# Patient Record
Sex: MALE | Race: WHITE | Employment: PART TIME | URBAN - METROPOLITAN AREA
[De-identification: names, ages, dates, MRNs, and addresses within clinical notes are randomized per-mention and may not be internally consistent; named-entity substitution may affect disease eponyms.]

---

## 2017-07-03 ENCOUNTER — OFFICE VISIT (OUTPATIENT)
Dept: INTERNAL MEDICINE CLINIC | Age: 77
End: 2017-07-03

## 2017-07-03 VITALS
WEIGHT: 236.6 LBS | HEIGHT: 67 IN | DIASTOLIC BLOOD PRESSURE: 70 MMHG | HEART RATE: 81 BPM | BODY MASS INDEX: 37.13 KG/M2 | SYSTOLIC BLOOD PRESSURE: 120 MMHG | OXYGEN SATURATION: 98 %

## 2017-07-03 DIAGNOSIS — E66.9 OBESITY (BMI 30-39.9): Chronic | ICD-10-CM

## 2017-07-03 DIAGNOSIS — K21.9 GASTROESOPHAGEAL REFLUX DISEASE WITHOUT ESOPHAGITIS: ICD-10-CM

## 2017-07-03 DIAGNOSIS — E78.5 HYPERLIPIDEMIA LDL GOAL <130: ICD-10-CM

## 2017-07-03 DIAGNOSIS — Z80.42 FAMILY HISTORY OF PROSTATE CANCER: ICD-10-CM

## 2017-07-03 DIAGNOSIS — I10 ESSENTIAL HYPERTENSION: Primary | Chronic | ICD-10-CM

## 2017-07-03 DIAGNOSIS — R73.01 IFG (IMPAIRED FASTING GLUCOSE): ICD-10-CM

## 2017-07-03 DIAGNOSIS — M89.9 DISORDER OF BONE: ICD-10-CM

## 2017-07-03 PROCEDURE — G8510 SCR DEP NEG, NO PLAN REQD: HCPCS | Performed by: INTERNAL MEDICINE

## 2017-07-03 PROCEDURE — 99214 OFFICE O/P EST MOD 30 MIN: CPT | Performed by: INTERNAL MEDICINE

## 2017-07-03 PROCEDURE — 3288F FALL RISK ASSESSMENT DOCD: CPT | Performed by: INTERNAL MEDICINE

## 2017-07-03 RX ORDER — LISINOPRIL 40 MG/1
40 TABLET ORAL DAILY
Qty: 90 TABLET | Refills: 3 | Status: ON HOLD | OUTPATIENT
Start: 2017-07-03 | End: 2018-05-02

## 2017-07-03 RX ORDER — TRIAMTERENE AND HYDROCHLOROTHIAZIDE 37.5; 25 MG/1; MG/1
1 CAPSULE ORAL DAILY
Qty: 90 CAPSULE | Refills: 3 | Status: SHIPPED | OUTPATIENT
Start: 2017-07-03 | End: 2018-09-08 | Stop reason: SDUPTHER

## 2017-07-03 ASSESSMENT — ENCOUNTER SYMPTOMS
BACK PAIN: 0
EYE REDNESS: 0
SPUTUM PRODUCTION: 0
BLOOD IN STOOL: 0
ORTHOPNEA: 0
ABDOMINAL PAIN: 0
HEMOPTYSIS: 0
CONSTIPATION: 0
DOUBLE VISION: 0
BLURRED VISION: 0
EYES NEGATIVE: 1
SORE THROAT: 0
WHEEZING: 0
RESPIRATORY NEGATIVE: 1
HEARTBURN: 0
GASTROINTESTINAL NEGATIVE: 1
COUGH: 0
EYE DISCHARGE: 0
DIARRHEA: 0
PHOTOPHOBIA: 0
STRIDOR: 0
EYE PAIN: 0
VOMITING: 0
NAUSEA: 0
SHORTNESS OF BREATH: 0

## 2017-07-03 ASSESSMENT — PATIENT HEALTH QUESTIONNAIRE - PHQ9
2. FEELING DOWN, DEPRESSED OR HOPELESS: 0
SUM OF ALL RESPONSES TO PHQ QUESTIONS 1-9: 0
1. LITTLE INTEREST OR PLEASURE IN DOING THINGS: 0
SUM OF ALL RESPONSES TO PHQ9 QUESTIONS 1 & 2: 0

## 2017-10-03 ENCOUNTER — OFFICE VISIT (OUTPATIENT)
Dept: ORTHOPEDIC SURGERY | Age: 77
End: 2017-10-03

## 2017-10-03 VITALS
WEIGHT: 230 LBS | BODY MASS INDEX: 36.02 KG/M2 | DIASTOLIC BLOOD PRESSURE: 64 MMHG | HEART RATE: 67 BPM | SYSTOLIC BLOOD PRESSURE: 109 MMHG

## 2017-10-03 DIAGNOSIS — Z96.652 STATUS POST TOTAL LEFT KNEE REPLACEMENT: Primary | Chronic | ICD-10-CM

## 2017-10-03 PROCEDURE — 99203 OFFICE O/P NEW LOW 30 MIN: CPT | Performed by: ORTHOPAEDIC SURGERY

## 2017-10-03 NOTE — PROGRESS NOTES
Effusion  neg   Ecchymosis  neg   Errythemia  neg   Warmth   neg   Deformity  neg   Crepitus  neg   Patellar Subluxation  neg   Tenderness  neg   Log Roll  neg   Patellar Apprehension  neg   Patellar Grind  neg   Extension Lag neg neg     Neurovascular Status:  intact    Range of Motion Extension Flexion Pules (0-4) Dorsalis  Pedis Posterior  Tibialis   Right 8 125         Degrees Right       Left   3 118         Degrees Left 2+        Test Including Ligamentous Stability/ Positive or Negative                                       Test          Right         Left   Valgus  neg   Varus                  neg                   Lachman  neg   Anterior Drawer  neg   Posterior Drawer  neg   Amna     Pivot Shift     Quadraceps Active     Atrophy       X-Ray Findings taken in Office:  3 views left knee read by myself show :Left Total replacement remaining in good position with no signs of ostial lysis or loosening. No evidence of fracture. Impression:   1. IT band tendinitis at the left knee likely related to overactivity doing exercises. 2. His left  total knee replacement otherwise appears to be doing well. Plan/Treatment:   1. He was told to his exercise program but to avoid irritating the IT band insertion. 2. He is otherwise to remain active and continue his aquatic exercises. 3. Keep his weight down. 4. Return here as needed. Sandra Sweet MD  10/3/2017    This dictation was done with Dragon dictnacho and may contain mechanical errors related to translation.

## 2017-10-03 NOTE — PATIENT INSTRUCTIONS
Impression:   1. IT band tendinitis at the left knee likely related to overactivity doing exercises. 2. His total knee replacement otherwise appears to be doing well. Plan/Treatment:   1. He was told to his exercise program to avoid irritating the IT band insertion. 2. He is otherwise to remain active and continue his aquatic exercises. 3. Keep his weight down. 4. Return here as needed.       Herminio Hutchinson MD  10/3/2017

## 2017-10-03 NOTE — MR AVS SNAPSHOT
people who are more muscular. Even a small weight loss (between 5 and 10 percent of your current weight) by decreasing your calorie intake and becoming more physically active will help lower your risk of developing or worsening diseases associated with obesity. Learn more at: [employer]acedco.uk             Medications and Orders      Your Current Medications Are              triamterene-hydrochlorothiazide (DYAZIDE) 37.5-25 MG per capsule Take 1 capsule by mouth daily    lisinopril (PRINIVIL;ZESTRIL) 40 MG tablet Take 1 tablet by mouth daily    omeprazole (PRILOSEC) 20 MG capsule Take 20 mg by mouth daily. aspirin 81 MG tablet Take 81 mg by mouth daily.         Allergies              Acetaminophen       We Ordered/Performed the following           XR KNEE LEFT (3 VIEWS)     Comments:    3V Lt Knee AP Standing, lat and sunrise         Result Summary for XR KNEE LEFT (3 VIEWS)      Result Information     Status          Final result (Exam End: 10/3/2017 11:17 AM)           10/3/2017 11:17 AM      Narrative & Impression           Radiology result is complete; follow up with provider / physician office for radiology results                       Additional Information        Basic Information     Date Of Birth Sex Race Ethnicity Preferred Language    1940 Male White Non-/Non  English      Problem List as of 10/3/2017  Date Reviewed: 10/3/2017                Hyperlipidemia LDL goal <130    Obesity (BMI 30-39.9) (Chronic)    Family history of prostate cancer    Status post total left knee replacement (Chronic)    Essential hypertension (Chronic)    Gastroesophageal reflux disease without esophagitis    Primary osteoarthritis of right knee (Chronic)    Osteoarthritis of left knee      Immunizations as of 10/3/2017     Name Date    Influenza Vaccine, unspecified formulation 12/6/2015    Influenza, Nancie Cranker, 3 Years and older, IM 10/5/2016 Pneumococcal 13-valent Conjugate ([id number]) 12/30/2016    Tdap (Boostrix, Adacel) 12/30/2016    Zoster 10/20/2015      Preventive Care        Date Due    Yearly Flu Vaccine (1) 9/1/2017    Pneumococcal Vaccines (two) for all adults aged 72 and over (2 of 2 - PPSV23) 12/30/2017    Cholesterol Screening 12/30/2021    Tetanus Combination Vaccine (2 - Td) 12/30/2026            MyChart Signup           [facility] allows you to send messages to your doctor, view your test results, renew your prescriptions, schedule appointments, view visit notes, and more. How Do I Sign Up? 1. In your Internet browser, go to [email or web address]  2. Click on the Sign Up Now link in the Sign In box. You will see the New Member Sign Up page. 3. Enter your [facility] Access Code exactly as it appears below. You will not need to use this code after youve completed the sign-up process. If you do not sign up before the expiration date, you must request a new code. [facility] Access Code: [email or web address]  Expires: 12/2/2017 12:37 PM    4. Enter your Social Security Number ([id number]) and Date of Birth (mm/dd/yyyy) as indicated and click Submit. You will be taken to the next sign-up page. 5. Create a [facility] ID. This will be your [facility] login ID and cannot be changed, so think of one that is secure and easy to remember. 6. Create a [facility] password. You can change your password at any time. 7. Enter your Password Reset Question and Answer. This can be used at a later time if you forget your password. 8. Enter your e-mail address. You will receive e-mail notification when new information is available in [address] 9. Click Sign Up. You can now view your medical record. Additional Information  If you have questions, please contact the physician practice where you receive care. Remember, [facility] is NOT to be used for urgent needs. For medical emergencies, dial 911. For questions regarding your [facility] [employer] account call [phone]. If you have a clinical question, please call your doctor's office.

## 2018-01-26 ENCOUNTER — OFFICE VISIT (OUTPATIENT)
Dept: INTERNAL MEDICINE CLINIC | Age: 78
End: 2018-01-26

## 2018-01-26 VITALS
RESPIRATION RATE: 16 BRPM | HEIGHT: 67 IN | SYSTOLIC BLOOD PRESSURE: 128 MMHG | WEIGHT: 241 LBS | TEMPERATURE: 97.6 F | OXYGEN SATURATION: 98 % | BODY MASS INDEX: 37.83 KG/M2 | DIASTOLIC BLOOD PRESSURE: 78 MMHG | HEART RATE: 76 BPM

## 2018-01-26 DIAGNOSIS — J40 BRONCHITIS: ICD-10-CM

## 2018-01-26 PROCEDURE — 99213 OFFICE O/P EST LOW 20 MIN: CPT | Performed by: INTERNAL MEDICINE

## 2018-01-26 RX ORDER — CEPHALEXIN 500 MG/1
500 CAPSULE ORAL 4 TIMES DAILY
COMMUNITY
End: 2018-05-01 | Stop reason: ALTCHOICE

## 2018-01-26 RX ORDER — PREDNISONE 20 MG/1
TABLET ORAL
Qty: 15 TABLET | Refills: 0 | Status: SHIPPED | OUTPATIENT
Start: 2018-01-26 | End: 2018-04-18 | Stop reason: SDUPTHER

## 2018-01-26 RX ORDER — AZITHROMYCIN 250 MG/1
TABLET, FILM COATED ORAL
Qty: 1 PACKET | Refills: 0 | Status: SHIPPED | OUTPATIENT
Start: 2018-01-26 | End: 2018-02-05

## 2018-01-26 ASSESSMENT — ENCOUNTER SYMPTOMS
GASTROINTESTINAL NEGATIVE: 1
SPUTUM PRODUCTION: 0
COUGH: 1
HEMOPTYSIS: 0
SHORTNESS OF BREATH: 1
EYES NEGATIVE: 1
WHEEZING: 1

## 2018-01-26 NOTE — PATIENT INSTRUCTIONS
good.  When should you call for help? Call 911 anytime you think you may need emergency care. For example, call if:  ? · You have severe trouble breathing. ?Call your doctor now or seek immediate medical care if:  ? · You have new or worse trouble breathing. ? · You cough up dark brown or bloody mucus (sputum). ? · You have a new or higher fever. ? · You have a new rash. ? Watch closely for changes in your health, and be sure to contact your doctor if:  ? · You cough more deeply or more often, especially if you notice more mucus or a change in the color of your mucus. ? · You are not getting better as expected. Where can you learn more? Go to [email or web address] and sign in to your [employer] account. Enter H333 in the FlightStats box to learn more about \"Bronchitis: Care Instructions. \"     If you do not have an account, please click on the \"Sign Up Now\" link. Current as of: May 12, 2017  Content Version: 11.5  © 6152-2690 Healthwise, Incorporated. Care instructions adapted under license by [facility] ([facility] [place]). If you have questions about a medical condition or this instruction, always ask your healthcare professional. [address] any warranty or liability for your use of this information.

## 2018-01-29 DIAGNOSIS — R06.02 SHORTNESS OF BREATH: Primary | ICD-10-CM

## 2018-01-29 RX ORDER — FLUTICASONE FUROATE AND VILANTEROL 100; 25 UG/1; UG/1
POWDER RESPIRATORY (INHALATION)
Qty: 1 EACH | Refills: 0 | COMMUNITY
Start: 2018-01-29 | End: 2018-05-01 | Stop reason: ALTCHOICE

## 2018-04-18 ENCOUNTER — OFFICE VISIT (OUTPATIENT)
Dept: INTERNAL MEDICINE CLINIC | Age: 78
End: 2018-04-18

## 2018-04-18 VITALS
BODY MASS INDEX: 37.83 KG/M2 | HEIGHT: 67 IN | OXYGEN SATURATION: 96 % | SYSTOLIC BLOOD PRESSURE: 118 MMHG | DIASTOLIC BLOOD PRESSURE: 68 MMHG | TEMPERATURE: 98.6 F | RESPIRATION RATE: 16 BRPM | WEIGHT: 241 LBS | HEART RATE: 89 BPM

## 2018-04-18 DIAGNOSIS — J40 BRONCHITIS: Primary | ICD-10-CM

## 2018-04-18 PROCEDURE — 99213 OFFICE O/P EST LOW 20 MIN: CPT | Performed by: INTERNAL MEDICINE

## 2018-04-18 RX ORDER — PREDNISONE 20 MG/1
TABLET ORAL
Qty: 15 TABLET | Refills: 0 | Status: SHIPPED | OUTPATIENT
Start: 2018-04-18 | End: 2018-05-01 | Stop reason: ALTCHOICE

## 2018-04-18 RX ORDER — DOXYCYCLINE HYCLATE 100 MG/1
100 CAPSULE ORAL 2 TIMES DAILY
Qty: 20 CAPSULE | Refills: 0 | Status: SHIPPED | OUTPATIENT
Start: 2018-04-18 | End: 2018-04-28

## 2018-04-18 ASSESSMENT — ENCOUNTER SYMPTOMS
COUGH: 1
HEMOPTYSIS: 0
WHEEZING: 1
SHORTNESS OF BREATH: 1
EYES NEGATIVE: 1
SPUTUM PRODUCTION: 0
GASTROINTESTINAL NEGATIVE: 1

## 2018-05-01 PROBLEM — G45.1 HEMISPHERIC CAROTID ARTERY SYNDROME: Status: ACTIVE | Noted: 2018-05-01

## 2018-05-01 PROBLEM — G45.9 TIA (TRANSIENT ISCHEMIC ATTACK): Status: ACTIVE | Noted: 2018-05-01

## 2018-05-03 ENCOUNTER — TELEPHONE (OUTPATIENT)
Dept: PHARMACY | Facility: CLINIC | Age: 78
End: 2018-05-03

## 2018-05-03 DIAGNOSIS — G45.1 HEMISPHERIC CAROTID ARTERY SYNDROME: Primary | ICD-10-CM

## 2018-05-03 PROCEDURE — 1111F DSCHRG MED/CURRENT MED MERGE: CPT

## 2018-05-11 ENCOUNTER — OFFICE VISIT (OUTPATIENT)
Dept: INTERNAL MEDICINE CLINIC | Age: 78
End: 2018-05-11

## 2018-05-11 VITALS
HEIGHT: 67 IN | DIASTOLIC BLOOD PRESSURE: 70 MMHG | SYSTOLIC BLOOD PRESSURE: 110 MMHG | TEMPERATURE: 98.3 F | HEART RATE: 97 BPM | BODY MASS INDEX: 36.57 KG/M2 | OXYGEN SATURATION: 85 % | WEIGHT: 233 LBS | RESPIRATION RATE: 16 BRPM

## 2018-05-11 DIAGNOSIS — I10 ESSENTIAL HYPERTENSION: Chronic | ICD-10-CM

## 2018-05-11 DIAGNOSIS — E78.5 HYPERLIPIDEMIA LDL GOAL <130: ICD-10-CM

## 2018-05-11 DIAGNOSIS — I45.10 INCOMPLETE RIGHT BUNDLE BRANCH BLOCK (RBBB) DETERMINED BY ELECTROCARDIOGRAPHY: ICD-10-CM

## 2018-05-11 DIAGNOSIS — E66.9 OBESITY (BMI 30-39.9): Chronic | ICD-10-CM

## 2018-05-11 DIAGNOSIS — G45.1 HEMISPHERIC CAROTID ARTERY SYNDROME: Primary | ICD-10-CM

## 2018-05-11 DIAGNOSIS — G45.9 TRANSIENT CEREBRAL ISCHEMIA, UNSPECIFIED TYPE: ICD-10-CM

## 2018-05-11 PROCEDURE — 99214 OFFICE O/P EST MOD 30 MIN: CPT | Performed by: INTERNAL MEDICINE

## 2018-05-11 ASSESSMENT — ENCOUNTER SYMPTOMS
HEMOPTYSIS: 0
ORTHOPNEA: 0
CONSTIPATION: 0
BACK PAIN: 0
NAUSEA: 0
SORE THROAT: 0
RESPIRATORY NEGATIVE: 1
WHEEZING: 0
HEARTBURN: 0
PHOTOPHOBIA: 0
BLURRED VISION: 0
EYE DISCHARGE: 0
BLOOD IN STOOL: 0
EYE PAIN: 0
DOUBLE VISION: 0
DIARRHEA: 0
VOMITING: 0
COUGH: 0
SPUTUM PRODUCTION: 0
ABDOMINAL PAIN: 0
EYE REDNESS: 0
GASTROINTESTINAL NEGATIVE: 1
EYES NEGATIVE: 1
SHORTNESS OF BREATH: 0
STRIDOR: 0

## 2018-05-17 ENCOUNTER — TELEPHONE (OUTPATIENT)
Dept: CARDIOLOGY CLINIC | Age: 78
End: 2018-05-17

## 2018-06-12 ENCOUNTER — TELEPHONE (OUTPATIENT)
Dept: INTERNAL MEDICINE CLINIC | Age: 78
End: 2018-06-12

## 2018-06-28 ENCOUNTER — OFFICE VISIT (OUTPATIENT)
Dept: CARDIOLOGY CLINIC | Age: 78
End: 2018-06-28

## 2018-06-28 VITALS
DIASTOLIC BLOOD PRESSURE: 70 MMHG | BODY MASS INDEX: 36.49 KG/M2 | OXYGEN SATURATION: 95 % | SYSTOLIC BLOOD PRESSURE: 138 MMHG | WEIGHT: 233 LBS | HEART RATE: 76 BPM

## 2018-06-28 DIAGNOSIS — I45.10 RBBB: ICD-10-CM

## 2018-06-28 DIAGNOSIS — G45.9 TRANSIENT CEREBRAL ISCHEMIA, UNSPECIFIED TYPE: Primary | ICD-10-CM

## 2018-06-28 PROCEDURE — 99204 OFFICE O/P NEW MOD 45 MIN: CPT | Performed by: INTERNAL MEDICINE

## 2018-07-03 ENCOUNTER — NURSE ONLY (OUTPATIENT)
Dept: CARDIOLOGY CLINIC | Age: 78
End: 2018-07-03

## 2018-08-14 PROCEDURE — 93228 REMOTE 30 DAY ECG REV/REPORT: CPT | Performed by: INTERNAL MEDICINE

## 2018-08-20 DIAGNOSIS — G45.9 TRANSIENT CEREBRAL ISCHEMIA, UNSPECIFIED TYPE: ICD-10-CM

## 2018-08-21 ENCOUNTER — OFFICE VISIT (OUTPATIENT)
Dept: INTERNAL MEDICINE CLINIC | Age: 78
End: 2018-08-21

## 2018-08-21 VITALS
OXYGEN SATURATION: 97 % | HEIGHT: 67 IN | WEIGHT: 231 LBS | BODY MASS INDEX: 36.26 KG/M2 | DIASTOLIC BLOOD PRESSURE: 72 MMHG | RESPIRATION RATE: 12 BRPM | SYSTOLIC BLOOD PRESSURE: 140 MMHG | HEART RATE: 80 BPM

## 2018-08-21 DIAGNOSIS — E78.00 HIGH CHOLESTEROL: ICD-10-CM

## 2018-08-21 DIAGNOSIS — K21.9 GASTROESOPHAGEAL REFLUX DISEASE, ESOPHAGITIS PRESENCE NOT SPECIFIED: ICD-10-CM

## 2018-08-21 DIAGNOSIS — I45.10 RBBB: ICD-10-CM

## 2018-08-21 DIAGNOSIS — I37.0 NONRHEUMATIC PULMONARY VALVE STENOSIS: ICD-10-CM

## 2018-08-21 DIAGNOSIS — I10 ESSENTIAL HYPERTENSION: ICD-10-CM

## 2018-08-21 PROCEDURE — 99214 OFFICE O/P EST MOD 30 MIN: CPT | Performed by: INTERNAL MEDICINE

## 2018-08-21 PROCEDURE — G8510 SCR DEP NEG, NO PLAN REQD: HCPCS | Performed by: INTERNAL MEDICINE

## 2018-08-21 PROCEDURE — 3288F FALL RISK ASSESSMENT DOCD: CPT | Performed by: INTERNAL MEDICINE

## 2018-08-21 ASSESSMENT — PATIENT HEALTH QUESTIONNAIRE - PHQ9
SUM OF ALL RESPONSES TO PHQ QUESTIONS 1-9: 0
SUM OF ALL RESPONSES TO PHQ QUESTIONS 1-9: 0
2. FEELING DOWN, DEPRESSED OR HOPELESS: 0
1. LITTLE INTEREST OR PLEASURE IN DOING THINGS: 0
SUM OF ALL RESPONSES TO PHQ9 QUESTIONS 1 & 2: 0

## 2018-08-21 NOTE — PROGRESS NOTES
OUTPATIENT PROGRESS NOTE  Date of Service:  8/21/2018  Address: [address] [place] [address] INTERNAL MEDICINE  [address] [place] [address]  Dept: [phone]    Subjective:    No chief complaint on file. Patient ID: [id number]  Sade Thomas is a 66 y.o. male  (Location/Symptom, Timing/Onset, Context/Setting, Quality, Duration, Modifying Factors, Severity)  HPIwith htn gerd and tia who is here to establish. He is active and exercising. He is feeling well. Records reviewed. Meds reviewed. Right ankle bothering him. It is more swollen skin more puffed up  Has been less swollen with swimming. Has noticed swelling in his legs for a bout a year. Allergies   Allergen Reactions    Acetaminophen      Current Outpatient Prescriptions   Medication Sig Dispense Refill    psyllium (KONSYL) 28.3 % PACK Take 1 packet by mouth 2 times daily      atorvastatin (LIPITOR) 40 MG tablet Take 1 tablet by mouth nightly 30 tablet 3    lisinopril (PRINIVIL;ZESTRIL) 10 MG tablet Take 1 tablet by mouth daily 30 tablet 5    triamterene-hydrochlorothiazide (DYAZIDE) 37.5-25 MG per capsule Take 1 capsule by mouth daily 90 capsule 3    omeprazole (PRILOSEC) 20 MG capsule Take 20 mg by mouth every 72 hours       aspirin 81 MG tablet Take 81 mg by mouth daily.  clopidogrel (PLAVIX) 75 MG tablet Take 1 tablet by mouth daily 30 tablet 11     No current facility-administered medications for this visit.       Past Medical History:   Diagnosis Date    Acid reflux     HTN (hypertension)      Past Surgical History:   Procedure Laterality Date    CATARACT REMOVAL Bilateral 8/13/2014    Dr. Daly Brochure Left 2/4/2013    Dr Walters March     Social History   Substance Use Topics    Smoking status: Never Smoker    Smokeless tobacco: Never Used    Alcohol use No     Family History   Problem Relation Age of observe treat doesn't want leroy right now    Sebastian Snyder MD

## 2018-08-22 ENCOUNTER — OFFICE VISIT (OUTPATIENT)
Dept: CARDIOLOGY CLINIC | Age: 78
End: 2018-08-22

## 2018-08-22 VITALS
WEIGHT: 231 LBS | DIASTOLIC BLOOD PRESSURE: 60 MMHG | HEART RATE: 70 BPM | SYSTOLIC BLOOD PRESSURE: 130 MMHG | BODY MASS INDEX: 36.18 KG/M2

## 2018-08-22 DIAGNOSIS — G45.9 TRANSIENT CEREBRAL ISCHEMIA, UNSPECIFIED TYPE: Primary | ICD-10-CM

## 2018-08-22 DIAGNOSIS — I70.90 ATHEROSCLEROTIC VASCULAR DISEASE: ICD-10-CM

## 2018-08-22 PROCEDURE — 99214 OFFICE O/P EST MOD 30 MIN: CPT | Performed by: NURSE PRACTITIONER

## 2018-08-22 NOTE — PROGRESS NOTES
Laterality Date    CATARACT REMOVAL Bilateral 8/13/2014    Dr. Jeffrey Zamudio Left 2/4/2013    Dr Jerry Portillo       Allergies   Allergen Reactions    Acetaminophen        Social History:  Reviewed. reports that he has never smoked. He has never used smokeless tobacco. He reports that he does not drink alcohol or use drugs. Family History:  Reviewed. family history includes Heart Failure in his brother; Prostate Cancer in his brother. Review of System:    · Constitutional: No fevers, chills. · Eyes: No visual changes or diplopia. No scleral icterus. · ENT: No Headaches. No mouth sores or sore throat. · Cardiovascular: No for chest pain, No for dyspnea on exertion, No for palpitations or No for loss of consciousness. No cough, hemoptysis, No for pleuritic pain, or phlebitis. · Respiratory: No for cough or wheezing. No hematemesis. · Gastrointestinal: No abdominal pain, blood in stools. · Genitourinary: No dysuria, or hematuria. · Musculoskeletal: No gait disturbance,    · Integumentary: No rash or pruritis. · Neurological: No headache, change in muscle strength, numbness or tingling. · Psychiatric: No anxiety, or depression. · Endocrine: No temperature intolerance. No excessive thirst, fluid intake, or urination. · Hem/Lymph: No abnormal bruising or bleeding, blood clots or swollen lymph nodes. · Allergic/Immunologic: No nasal congestion or hives. Physical Examination:  Vitals:    08/22/18 1103   BP: 130/60   Pulse: 70         Wt Readings from Last 3 Encounters:   08/22/18 231 lb (104.8 kg)   08/21/18 231 lb (104.8 kg)   06/28/18 233 lb (105.7 kg)       · Constitutional: Oriented. No distress. · Head: Normocephalic and atraumatic. · Mouth/Throat: Oropharynx is clear and moist.   · Eyes: Conjunctivae clear without jaunduice. PERRL. · Neck: Neck supple. No rigidity. No JVD present. · Cardiovascular: Normal rate, regular rhythm, S1&S2. · Pulmonary/Chest: Bilateral respiratory sounds. No wheezes, No rhonchi. · Abdominal: Soft. Bowel sounds present. No distension, No tenderness. · Musculoskeletal: No tenderness. No edema    · Lymphadenopathy: Has no cervical adenopathy. · Neurological: Alert and oriented. Cranial nerve appears intact, No Gross deficit   · Skin: Skin is warm and dry. No rash noted. · Psychiatric: Has a normal mood, affect and behavior     Labs:  Reviewed. No results for input(s): NA, K, CL, CO2, PHOS, BUN, CREATININE in the last 72 hours. Invalid input(s): CA,  TSH  No results for input(s): WBC, HGB, HCT, MCV, PLT in the last 72 hours. Lab Results   Component Value Date    TROPONINI <0.01 05/01/2018     No results found for: BNP  Lab Results   Component Value Date    PROTIME 11.5 05/01/2018    PROTIME 10.6 01/30/2013    INR 1.02 05/01/2018    INR 0.93 01/30/2013     Lab Results   Component Value Date    CHOL 176 05/02/2018    HDL 50 05/02/2018    TRIG 151 05/02/2018       ECG: Personally reviewed: NSR, HR 70, , ,     ECHO:  5/2/2018  Summary   Normal LV size and systolic function. Estimated ejection fraction is 60%.    Mild pulmonic stenosis.   Trivial mitral regurgitation is present.   The left atrium is normal in size.   The right ventricle is normal in size and function.   Mild tricuspid regurgitation.       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

## 2018-08-27 DIAGNOSIS — I10 ESSENTIAL HYPERTENSION: Chronic | ICD-10-CM

## 2018-08-27 RX ORDER — LISINOPRIL 10 MG/1
10 TABLET ORAL DAILY
Qty: 30 TABLET | Refills: 5 | Status: SHIPPED | OUTPATIENT
Start: 2018-08-27 | End: 2018-09-24 | Stop reason: SDUPTHER

## 2018-08-27 RX ORDER — ATORVASTATIN CALCIUM 40 MG/1
40 TABLET, FILM COATED ORAL NIGHTLY
Qty: 30 TABLET | Refills: 3 | Status: SHIPPED | OUTPATIENT
Start: 2018-08-27 | End: 2018-09-24 | Stop reason: SDUPTHER

## 2018-09-07 ENCOUNTER — TELEPHONE (OUTPATIENT)
Dept: CARDIOLOGY CLINIC | Age: 78
End: 2018-09-07

## 2018-09-07 NOTE — TELEPHONE ENCOUNTER
Patient called to inform Corrie Cruz that he will go ahead get the loop recorder they talked about in his 8/22 appointment. Some dates that work for patient are Sep 25.26,27,28. October is open but No Mondays.      Call back is [phone]

## 2018-09-08 DIAGNOSIS — I10 ESSENTIAL HYPERTENSION: Chronic | ICD-10-CM

## 2018-09-09 RX ORDER — TRIAMTERENE AND HYDROCHLOROTHIAZIDE 37.5; 25 MG/1; MG/1
1 CAPSULE ORAL DAILY
Qty: 90 CAPSULE | Refills: 3 | Status: SHIPPED | OUTPATIENT
Start: 2018-09-09 | End: 2018-09-24 | Stop reason: SDUPTHER

## 2018-09-10 ENCOUNTER — NURSE ONLY (OUTPATIENT)
Dept: CARDIOLOGY CLINIC | Age: 78
End: 2018-09-10

## 2018-09-10 NOTE — PROGRESS NOTES
Implantable Loop Recorder    Date of Procedure:  Sept. 13, 2018    Time of Arrival:  11:30 am    Cardiologist performing procedure:  Dr. Martinez Arnold    · Arrive at [facility] through the main entrance. Check in at the Outpatient Diagnostic desk on the 1st floor. · No food or drink for 2 hours before your procedure. · You may brush your teeth and rinse the morning of the procedure. · Take ALL of your routine medications the morning of the procedure. · Do NOT stop taking aspirin, Plavix, coumadin, Eliquis, Xarelto, or Pradaxa (any blood thinners)    · Please use Hibiclens soap to wash your neck, chest, and abdomen the night before your procedure. · Do not apply any lotion, deodorant, or powder the morning of the procedure. · Please bring a list of your medications to the hospital with you. · You may drive yourself home. · If you are unable to make this appointment, please call [facility] Cardiology at [phone].

## 2018-09-13 ENCOUNTER — HOSPITAL ENCOUNTER (OUTPATIENT)
Dept: CARDIAC CATH/INVASIVE PROCEDURES | Age: 78
Discharge: HOME | End: 2018-09-13
Attending: INTERNAL MEDICINE | Admitting: INTERNAL MEDICINE
Payer: MEDICARE

## 2018-09-13 PROBLEM — Z45.09 ENCOUNTER FOR ELECTRONIC ANALYSIS OF REVEAL EVENT RECORDER: Status: ACTIVE | Noted: 2018-09-13

## 2018-09-13 LAB
REASON FOR REJECTION: NORMAL
REJECTED TEST: NORMAL

## 2018-09-13 PROCEDURE — 33282 PR IMPLANTATION PT-ACTIVATED CARDIAC EVENT RECORDER: CPT | Performed by: INTERNAL MEDICINE

## 2018-09-13 PROCEDURE — 33282 HC IMPANTABLE LOOP RECORDER: CPT

## 2018-09-13 PROCEDURE — C1764 EVENT RECORDER, CARDIAC: HCPCS

## 2018-09-13 PROCEDURE — 85610 PROTHROMBIN TIME: CPT

## 2018-09-13 RX ORDER — SODIUM CHLORIDE 9 MG/ML
INJECTION, SOLUTION INTRAVENOUS CONTINUOUS
Status: DISCONTINUED | OUTPATIENT
Start: 2018-09-13 | End: 2018-09-13 | Stop reason: HOSPADM

## 2018-09-13 RX ORDER — SODIUM CHLORIDE 0.9 % (FLUSH) 0.9 %
10 SYRINGE (ML) INJECTION PRN
Status: DISCONTINUED | OUTPATIENT
Start: 2018-09-13 | End: 2018-09-13 | Stop reason: HOSPADM

## 2018-09-13 RX ORDER — SODIUM CHLORIDE 0.9 % (FLUSH) 0.9 %
10 SYRINGE (ML) INJECTION EVERY 12 HOURS SCHEDULED
Status: DISCONTINUED | OUTPATIENT
Start: 2018-09-13 | End: 2018-09-13 | Stop reason: HOSPADM

## 2018-09-13 NOTE — H&P
HPI:  Marlyn Ellis is a 68 y.o. male referred by Dr. Reba He to EP for evaluation after TIA. H/o GERD, HTN, HLD, prediabetes, and obesity. Presented to [facility] on 5/1/18 with sudden onset slurred speech, facial droop, and gait abnormality which lasted about 10 min. EKG in ED showed incomplete RBBB and CTA head showed no high-grade stenosis or focal occlusion involving the intracranial vasculature.  No evidence of intracranial dissection. No evidence of acute CVA. Also, noted was approximately 2-3 mm outpouching arising from the left internal carotid artery projecting anteriorly. Echo (5/2/18) showed EF of 60% and some trivial mitral regurgitation and mild pulmonic stenosis. He was started on Plavix, ASA, and statin.       Has been asymptomatic since hospital discharge. Denies complaints of CP, SOB, dizziness, palpitations, orthopnea, presycope/syncope, or edema. He is fairly active doing yard work and swimming. Good exercise tolerance. There is no family history of atrial fibrillation     Past Medical History:   has a past medical history of Acid reflux and HTN (hypertension).     Surgical History:   has a past surgical history that includes Total knee arthroplasty (Left, 2/4/2013); Esophagus dilation (2006); Tonsillectomy and adenoidectomy (1946); and Cataract removal (Bilateral, 8/13/2014).    Social History:   reports that he has never smoked.  He has never used smokeless tobacco. He reports that he does not drink alcohol or use drugs.      Family History:  family history includes Heart Failure in his brother; Prostate Cancer in his brother.      Home Medications:  Encounter Medications          Outpatient Encounter Prescriptions as of 6/28/2018   Medication Sig Dispense Refill    atorvastatin (LIPITOR) 40 MG tablet Take 1 tablet by mouth nightly 30 tablet 3    clopidogrel (PLAVIX) 75 MG tablet Take 1 tablet by mouth daily 30 tablet 11    lisinopril (PRINIVIL;ZESTRIL) 10 MG tablet Take 1 tablet patient without evidence for vascular disease. Unrecognized atrial fibrillation remains a possible etiology for the episode. 2. RBBB             Plan:  1. ILR implant for AF surveillance.   2.  Follow up in 6-8 weeks after monitor.  Chay Ramos M.D.

## 2018-09-13 NOTE — PROCEDURES
After informed consent was obtained the patient is brought to the cardiac electrophysiology laboratory in the fasting state on 9/13/18. He is prepared for the procedure with application of ECG electrodes. The left chest was shaved and prepped with antibacterial soap and the patient was draped sterile fashion. A [employer] [id number] ILR serial number [id number] was implanted in the left parasternal 4th intercostal space SQ. R-wave sensing was 0.33 mV. The incision was closed with a single 3-0 Prolene suture and Steri-Strips. The wound was covered by dry sterile dressing. The patient tolerated the procedure well. There were no apparent complications.

## 2018-09-14 LAB — INR BLD: 1.2 (ref 0.8–1.4)

## 2018-09-20 ENCOUNTER — PROCEDURE VISIT (OUTPATIENT)
Dept: CARDIOLOGY CLINIC | Age: 78
End: 2018-09-20

## 2018-09-20 DIAGNOSIS — Z45.09 ENCOUNTER FOR ELECTRONIC ANALYSIS OF REVEAL EVENT RECORDER: ICD-10-CM

## 2018-09-20 DIAGNOSIS — I63.9 CRYPTOGENIC STROKE (HCC): Primary | ICD-10-CM

## 2018-09-20 PROCEDURE — 93291 INTERROG DEV EVAL SCRMS IP: CPT | Performed by: INTERNAL MEDICINE

## 2018-10-24 NOTE — PROGRESS NOTES
Dr. Donavon Garces Left 2/4/2013    Dr Nair Prophet       Allergies   Allergen Reactions    Acetaminophen        Social History:  Reviewed. reports that he has never smoked. He has never used smokeless tobacco. He reports that he does not drink alcohol or use drugs. Family History:  Reviewed. family history includes Heart Failure in his brother; Prostate Cancer in his brother. Review of System:    · Constitutional: No fevers, chills. · Eyes: No visual changes or diplopia. No scleral icterus. · ENT: No Headaches. No mouth sores or sore throat. · Cardiovascular: No for chest pain, No for dyspnea on exertion, No for palpitations or No for loss of consciousness. No cough, hemoptysis, No for pleuritic pain, or phlebitis. · Respiratory: No for cough or wheezing. No hematemesis. · Gastrointestinal: No abdominal pain, blood in stools. · Genitourinary: No dysuria, or hematuria. · Musculoskeletal: No gait disturbance,    · Integumentary: No rash or pruritis. · Neurological: No headache, change in muscle strength, numbness or tingling. · Psychiatric: No anxiety, or depression. · Endocrine: No temperature intolerance. No excessive thirst, fluid intake, or urination. · Hem/Lymph: No abnormal bruising or bleeding, blood clots or swollen lymph nodes. · Allergic/Immunologic: No nasal congestion or hives. Physical Examination:  Vitals:    10/25/18 1315   BP: 136/84   Pulse: 74   SpO2: 93%         Wt Readings from Last 3 Encounters:   10/25/18 231 lb (104.8 kg)   08/22/18 231 lb (104.8 kg)   08/21/18 231 lb (104.8 kg)       · Constitutional: Oriented. No distress. · Head: Normocephalic and atraumatic. · Mouth/Throat: Oropharynx is clear and moist.   · Eyes: Conjunctivae clear without jaunduice. PERRL. · Neck: Neck supple. No rigidity. No JVD present. · Cardiovascular: Normal rate, regular rhythm, S1&S2. hypertension 01/22/2013    Gastroesophageal reflux disease without esophagitis 01/22/2013    Primary osteoarthritis of right knee 10/23/2012    Osteoarthritis of left knee 10/23/2012    GERD (gastroesophageal reflux disease) 01/01/2002        Assessment:   1. TIA (transient ischemic attack)    2. Atherosclerotic vascular disease        Cardiac HX: Sharee Joshi is a 66 y.o. man with a h/o HTN, HLD, RBBB, TIA, seen by EP for eval of atrial arhythmia as a cause of CVA/TIA. TIA  - 30 day monitor MCOT (7/11/18 - 8/9/18) showed min HR of 50, avg of 76, max 120, occasional PACs, PVCs. No AF.  - S/p MDT loop implant  - Device check today shows NSR, no arrhythmias    Atherosclerotic disease  - CT of the neck showed some atherosclerosis in the aortic arch and bilateral subclavian arteries  - Consider stress test if s/s   - ECG ordered and results personally reviewed     EF of 48%  No systolic HF  No known CAD  No known AF   No Tobacco use. All questions and concerns were addressed to the patient/family. Alternatives to my treatment were discussed. The note was completed using EMR. Every effort was made to ensure accuracy; however, inadvertent computerized transcription errors may be present. Patient received education regarding their diagnosis, treatment and medications while in the office today.       Gabriella Miller[address]

## 2018-10-25 ENCOUNTER — OFFICE VISIT (OUTPATIENT)
Dept: CARDIOLOGY CLINIC | Age: 78
End: 2018-10-25
Payer: MEDICARE

## 2018-10-25 ENCOUNTER — PROCEDURE VISIT (OUTPATIENT)
Dept: CARDIOLOGY CLINIC | Age: 78
End: 2018-10-25
Payer: MEDICARE

## 2018-10-25 VITALS
SYSTOLIC BLOOD PRESSURE: 136 MMHG | BODY MASS INDEX: 36.18 KG/M2 | WEIGHT: 231 LBS | OXYGEN SATURATION: 93 % | DIASTOLIC BLOOD PRESSURE: 84 MMHG | HEART RATE: 74 BPM

## 2018-10-25 DIAGNOSIS — I70.90 ATHEROSCLEROTIC VASCULAR DISEASE: ICD-10-CM

## 2018-10-25 DIAGNOSIS — G45.9 TIA (TRANSIENT ISCHEMIC ATTACK): Primary | ICD-10-CM

## 2018-10-25 DIAGNOSIS — Z45.09 ENCOUNTER FOR ELECTRONIC ANALYSIS OF REVEAL EVENT RECORDER: ICD-10-CM

## 2018-10-25 DIAGNOSIS — I63.9 CRYPTOGENIC STROKE (HCC): ICD-10-CM

## 2018-10-25 PROCEDURE — 99214 OFFICE O/P EST MOD 30 MIN: CPT | Performed by: NURSE PRACTITIONER

## 2018-10-25 PROCEDURE — 93000 ELECTROCARDIOGRAM COMPLETE: CPT | Performed by: NURSE PRACTITIONER

## 2018-10-25 PROCEDURE — 93291 INTERROG DEV EVAL SCRMS IP: CPT | Performed by: INTERNAL MEDICINE

## 2018-12-04 ENCOUNTER — PROCEDURE VISIT (OUTPATIENT)
Dept: CARDIOLOGY CLINIC | Age: 78
End: 2018-12-04
Payer: MEDICARE

## 2018-12-04 ENCOUNTER — OFFICE VISIT (OUTPATIENT)
Dept: CARDIOLOGY CLINIC | Age: 78
End: 2018-12-04
Payer: MEDICARE

## 2018-12-04 VITALS
DIASTOLIC BLOOD PRESSURE: 70 MMHG | BODY MASS INDEX: 36.34 KG/M2 | SYSTOLIC BLOOD PRESSURE: 132 MMHG | WEIGHT: 232 LBS | HEART RATE: 68 BPM

## 2018-12-04 DIAGNOSIS — I45.10 RBBB: ICD-10-CM

## 2018-12-04 DIAGNOSIS — Z45.09 ENCOUNTER FOR ELECTRONIC ANALYSIS OF REVEAL EVENT RECORDER: ICD-10-CM

## 2018-12-04 DIAGNOSIS — I63.9 CRYPTOGENIC STROKE (HCC): ICD-10-CM

## 2018-12-04 DIAGNOSIS — G45.9 TIA (TRANSIENT ISCHEMIC ATTACK): Primary | ICD-10-CM

## 2018-12-04 PROCEDURE — 99214 OFFICE O/P EST MOD 30 MIN: CPT | Performed by: INTERNAL MEDICINE

## 2018-12-04 PROCEDURE — 93291 INTERROG DEV EVAL SCRMS IP: CPT | Performed by: INTERNAL MEDICINE

## 2019-02-05 ENCOUNTER — NURSE ONLY (OUTPATIENT)
Dept: CARDIOLOGY CLINIC | Age: 79
End: 2019-02-05
Payer: MEDICARE

## 2019-02-05 DIAGNOSIS — Z45.09 ENCOUNTER FOR ELECTRONIC ANALYSIS OF REVEAL EVENT RECORDER: ICD-10-CM

## 2019-02-05 DIAGNOSIS — I63.9 CRYPTOGENIC STROKE (HCC): ICD-10-CM

## 2019-02-05 PROCEDURE — 93298 REM INTERROG DEV EVAL SCRMS: CPT | Performed by: INTERNAL MEDICINE

## 2019-02-05 PROCEDURE — 93299 PR REM INTERROG ICPMS/SCRMS <30 D TECH REVIEW: CPT | Performed by: INTERNAL MEDICINE

## 2019-02-06 ENCOUNTER — TELEPHONE (OUTPATIENT)
Dept: CARDIOLOGY CLINIC | Age: 79
End: 2019-02-06

## 2019-02-18 ENCOUNTER — PROCEDURE VISIT (OUTPATIENT)
Dept: CARDIOLOGY CLINIC | Age: 79
End: 2019-02-18
Payer: MEDICARE

## 2019-02-18 ENCOUNTER — OFFICE VISIT (OUTPATIENT)
Dept: CARDIOLOGY CLINIC | Age: 79
End: 2019-02-18
Payer: MEDICARE

## 2019-02-18 VITALS
BODY MASS INDEX: 36.09 KG/M2 | DIASTOLIC BLOOD PRESSURE: 74 MMHG | WEIGHT: 230.4 LBS | HEART RATE: 69 BPM | SYSTOLIC BLOOD PRESSURE: 128 MMHG

## 2019-02-18 DIAGNOSIS — I63.9 CRYPTOGENIC STROKE (HCC): Primary | ICD-10-CM

## 2019-02-18 DIAGNOSIS — G45.9 TIA (TRANSIENT ISCHEMIC ATTACK): ICD-10-CM

## 2019-02-18 DIAGNOSIS — Z45.09 ENCOUNTER FOR ELECTRONIC ANALYSIS OF REVEAL EVENT RECORDER: ICD-10-CM

## 2019-02-18 DIAGNOSIS — I63.9 CRYPTOGENIC STROKE (HCC): ICD-10-CM

## 2019-02-18 DIAGNOSIS — I48.0 PAROXYSMAL ATRIAL FIBRILLATION (HCC): ICD-10-CM

## 2019-02-18 DIAGNOSIS — I45.10 RBBB: ICD-10-CM

## 2019-02-18 LAB
ANION GAP SERPL CALCULATED.3IONS-SCNC: 11 MMOL/L (ref 3–16)
BUN BLDV-MCNC: 14 MG/DL (ref 7–20)
CALCIUM SERPL-MCNC: 9.5 MG/DL (ref 8.3–10.6)
CHLORIDE BLD-SCNC: 99 MMOL/L (ref 99–110)
CO2: 30 MMOL/L (ref 21–32)
CREAT SERPL-MCNC: 1 MG/DL (ref 0.8–1.3)
GFR AFRICAN AMERICAN: >60
GFR NON-AFRICAN AMERICAN: >60
GLUCOSE BLD-MCNC: 101 MG/DL (ref 70–99)
POTASSIUM SERPL-SCNC: 3.8 MMOL/L (ref 3.5–5.1)
SODIUM BLD-SCNC: 140 MMOL/L (ref 136–145)

## 2019-02-18 PROCEDURE — 99214 OFFICE O/P EST MOD 30 MIN: CPT | Performed by: INTERNAL MEDICINE

## 2019-02-18 PROCEDURE — 93291 INTERROG DEV EVAL SCRMS IP: CPT | Performed by: INTERNAL MEDICINE

## 2019-02-21 ENCOUNTER — TELEPHONE (OUTPATIENT)
Dept: CARDIOLOGY CLINIC | Age: 79
End: 2019-02-21

## 2019-03-19 ENCOUNTER — NURSE ONLY (OUTPATIENT)
Dept: CARDIOLOGY CLINIC | Age: 79
End: 2019-03-19
Payer: MEDICARE

## 2019-03-19 DIAGNOSIS — Z45.09 ENCOUNTER FOR ELECTRONIC ANALYSIS OF REVEAL EVENT RECORDER: ICD-10-CM

## 2019-03-19 DIAGNOSIS — I63.9 CRYPTOGENIC STROKE (HCC): ICD-10-CM

## 2019-03-19 PROCEDURE — 93299 PR REM INTERROG ICPMS/SCRMS <30 D TECH REVIEW: CPT | Performed by: INTERNAL MEDICINE

## 2019-03-19 PROCEDURE — 93298 REM INTERROG DEV EVAL SCRMS: CPT | Performed by: INTERNAL MEDICINE

## 2019-04-23 ENCOUNTER — NURSE ONLY (OUTPATIENT)
Dept: CARDIOLOGY CLINIC | Age: 79
End: 2019-04-23
Payer: MEDICARE

## 2019-04-23 DIAGNOSIS — I63.9 CRYPTOGENIC STROKE (HCC): ICD-10-CM

## 2019-04-23 DIAGNOSIS — Z45.09 ENCOUNTER FOR ELECTRONIC ANALYSIS OF REVEAL EVENT RECORDER: ICD-10-CM

## 2019-04-23 PROCEDURE — 93298 REM INTERROG DEV EVAL SCRMS: CPT | Performed by: INTERNAL MEDICINE

## 2019-04-23 PROCEDURE — 93299 PR REM INTERROG ICPMS/SCRMS <30 D TECH REVIEW: CPT | Performed by: INTERNAL MEDICINE

## 2019-04-23 NOTE — LETTER
[address] [facility] [phone] [address] [facility]  [address]  [facility]    Pacemaker/Defibrillator Clinic      04/25/19        Apurva Cerna  [address] [place] [address]        Dear Apurva Cerna    This letter is to inform you that we received the transmission from your monitor at home that checks your pacemaker and/or defibrillator, or implanted heart monitor. The next date your monitor will automatically transmit will be 5/28/19. Please do not send additional routine transmissions unless specifically requested. Your device and monitor are wireless and most transmit cellularly, but please periodically check your monitor is still plugged in to the electrical outlet. If you still use the telephone land line to send please ensure the connection to the phone dot is secure. This will help to ensure successful automatic transmissions in the future. Also, the monitor needs to be close to you while sleeping at night. Please be aware that the remote device transmission sites are periodically monitored only during regular business hours during which simultaneous in-office device clinics are being run. If your transmission requires attention, we will contact you as soon as possible. Please also keep in mind that implanted devices should be checked in the office manually once per year. Please call our office if you need to schedule this visit. Thank you.             Padmini Flaherty

## 2019-04-25 NOTE — PROGRESS NOTES
Carelink transmission shows normal Linq/ILR function. Battery function stable. No symptoms or episodes recorded. Strip from Feb possibly atrial fibrillation. Pt on Xarelto. See Paceart report under cardiology tab. Recheck 1 mos.  mk

## 2019-05-28 ENCOUNTER — NURSE ONLY (OUTPATIENT)
Dept: CARDIOLOGY CLINIC | Age: 79
End: 2019-05-28
Payer: MEDICARE

## 2019-05-28 DIAGNOSIS — I63.9 CRYPTOGENIC STROKE (HCC): ICD-10-CM

## 2019-05-28 DIAGNOSIS — Z45.09 ENCOUNTER FOR ELECTRONIC ANALYSIS OF REVEAL EVENT RECORDER: ICD-10-CM

## 2019-05-28 PROCEDURE — 93298 REM INTERROG DEV EVAL SCRMS: CPT | Performed by: INTERNAL MEDICINE

## 2019-05-28 PROCEDURE — 93299 PR REM INTERROG ICPMS/SCRMS <30 D TECH REVIEW: CPT | Performed by: INTERNAL MEDICINE

## 2019-05-28 NOTE — LETTER
[address] [facility] [phone]  [address] [phone]    Pacemaker/Defibrillator Clinic          06/04/19        Sumi Fan  [address] [place] [address]        Dear Sumi Fan    This letter is to inform you that we received the transmission from your monitor at home that checks your pacemaker and/or defibrillator, or implanted heart monitor. The next date your monitor will automatically transmit will be 7/23/19. Your device and monitor are wireless and most transmit cellularly, but please periodically check your monitor is still plugged in to the electrical outlet. If you still use the telephone land line to send please ensure the connection to the phone dot is secure. This will help to ensure successful automatic transmissions in the future. Also, the monitor needs to be close to you while sleeping at night. Please be aware that the remote device transmission sites are periodically monitored only during regular business hours during which simultaneous in-office device clinics are being run. If your transmission requires attention, we will contact you as soon as possible. Thank you. We will check your device in office when you see Dr. Faustino De La Cruz 6/21/19.         [facility]

## 2019-06-04 NOTE — PROGRESS NOTES
Remote interrogation of implanted cardiac event monitor shows normal function. Dx  TIA. Observations Summary 23-Apr-2019 00:05 to 28-May-2019 00:05  1 PAF 5/24 x 2.5 hrs, known hx and on anticoag. Asymptomatic. MICHEAL ALBERTO 6/21/19  Follow up 1 month via [employer]. See PACEART report under Cardiology tab.

## 2019-06-19 NOTE — PROGRESS NOTES
[facility]   Cardiac Consultation    Referring Provider:  Nhung Murdock MD     Chief Complaint   Patient presents with    Atrial Fibrillation    Irregular Heart Beat     RBBB    Transient Ischemic Attack     HPI:  Pamela Magallon is a 66 y.o. male referred by Dr. Chucky Bowman to EP for evaluation after TIA. H/o GERD, HTN, HLD, prediabetes, and obesity. Presented to [facility] on 5/1/18 with sudden onset slurred speech, facial droop, and gait abnormality which lasted about 10-20 sec. EKG in ED showed incomplete RBBB and CTA head showed no high-grade stenosis or focal occlusion involving the intracranial vasculature.  No evidence of intracranial dissection. No evidence of acute CVA. Also, noted was approximately 2-3 mm outpouching arising from the left internal carotid artery projecting anteriorly. Echo (5/2/18) showed EF of 60% and some trivial mitral regurgitation and mild pulmonic stenosis. He was started on Plavix, ASA, and statin. 30 day monitor (7/11/18-8/9/18) showed SR with average HR of 76 () and occasional PACs and PVCs, no AF noted. S/p ILR implant on 9/13/18 for long term monitoring. There is no family history of AF.     Device check on 6/21/19 shows one episode of AF on 5/24/19 lasting 2 hours and 18 mins, other parameters stable. The patient is on Xarelto for stroke prevention and tolerating it well. Patient denies complaints of CP, SOB, dizziness, palpitations, orthopnea, or presycope/syncope. The patient stays active gardening and swimming at the [facility]. He reports good activity tolerance. Past Medical History:   has a past medical history of GERD (gastroesophageal reflux disease), High cholesterol, HTN (hypertension), Pulmonic stenosis, RBBB, and TIA (transient ischemic attack). Surgical History:   has a past surgical history that includes Total knee arthroplasty (Left, 2/4/2013); Esophagus dilation (2006);  Tonsillectomy and adenoidectomy (1946); and Cataract removal (Bilateral, 8/13/2014). Social History:   reports that he has never smoked. He has never used smokeless tobacco. He reports that he does not drink alcohol or use drugs. Family History:  family history includes Heart Failure in his brother; Prostate Cancer in his brother. Home Medications:  Outpatient Encounter Medications as of 6/21/2019   Medication Sig Dispense Refill    triamterene-hydrochlorothiazide (DYAZIDE) 37.5-25 MG per capsule TAKE 1 CAPSULE DAILY 90 capsule 0    lisinopril (PRINIVIL;ZESTRIL) 10 MG tablet TAKE 1 TABLET DAILY 90 tablet 0    atorvastatin (LIPITOR) 40 MG tablet TAKE 1 TABLET NIGHTLY 90 tablet 0    rivaroxaban (XARELTO) 20 MG TABS tablet Take 1 tablet by mouth daily (with breakfast) 90 tablet 3    psyllium (KONSYL) 28.3 % PACK Take 1 packet by mouth 2 times daily      omeprazole (PRILOSEC) 20 MG capsule Take 20 mg by mouth every 72 hours        No facility-administered encounter medications on file as of 6/21/2019. Allergies:  Acetaminophen     Review of Systems   Constitutional: Negative. HENT: Negative. Eyes: Negative. Respiratory: Negative. Cardiovascular: Negative. Gastrointestinal: Negative. Genitourinary: Negative. Musculoskeletal: Negative. Skin: Negative. Neurological: Negative. Hematological: Negative. Psychiatric/Behavioral: Negative. BP (!) 142/68   Pulse 65   Ht 5' 7\" (1.702 m)   Wt 231 lb 6.4 oz (105 kg)   BMI 36.24 kg/m²     EKG 6/21/19 Personally reviewed. SR61,  ,. Objective:  Physical Exam   Constitutional: He is oriented to person, place, and time. He appears well-developed and well-nourished. HENT:   Head: Normocephalic and atraumatic. Eyes: Pupils are equal, round, and reactive to light. Neck: Normal range of motion. Cardiovascular: Normal rate, regular rhythm and normal heart sounds. Pulmonary/Chest: Effort normal and breath sounds normal.   Abdominal: Soft. No tenderness. Musculoskeletal: Normal range of motion. He exhibits no edema. Neurological: He is alert and oriented to person, place, and time. Skin: Skin is warm and dry. Psychiatric: He has a normal mood and affect. Echo 5/2/18  Normal LV size and systolic function. Estimated ejection fraction is 60%.  Mild pulmonic stenosis.   Trivial mitral regurgitation is present.   The left atrium is normal in size.   The right ventricle is normal in size and function.   Mild tricuspid regurgitation. Assessment:  1. Transient cerebral ischemia- On 5/1/18, isolated episode in a patient without evidence for vascular disease. 2. RBBB    3. PAF--First seen on ILR on 2/7/19, asymptomatic, lasting 2 hrs. [id number] score 5 (TIA, age, HTN). One episode on 5/24/19 lasting 2 hours 18 mins. Plan:  1. Last creatinine 1.0 on 2/18/19 Creatinine clearance is 90 on 6/21/19  2. Continue Xarelto 20 mg daily. 3.  Continue activity as tolerated  4. Continue remote home transmissions every month. 5.  Follow up in 6 months with device check. Humberto Chu RN, am scribing for and in the presence of Dr. Dimas Paez. 06/21/19   9:16 AM  Lucia Woodard RN    I, Dr. Cochran Sat, personally performed the services described in this documentation as scribed by Lucia Woodard RN  in my presence, and it is both accurate and complete.

## 2019-06-21 ENCOUNTER — OFFICE VISIT (OUTPATIENT)
Dept: CARDIOLOGY CLINIC | Age: 79
End: 2019-06-21
Payer: MEDICARE

## 2019-06-21 ENCOUNTER — NURSE ONLY (OUTPATIENT)
Dept: CARDIOLOGY CLINIC | Age: 79
End: 2019-06-21
Payer: MEDICARE

## 2019-06-21 VITALS
BODY MASS INDEX: 36.32 KG/M2 | DIASTOLIC BLOOD PRESSURE: 68 MMHG | SYSTOLIC BLOOD PRESSURE: 142 MMHG | HEART RATE: 65 BPM | HEIGHT: 67 IN | WEIGHT: 231.4 LBS

## 2019-06-21 DIAGNOSIS — G45.9 TIA (TRANSIENT ISCHEMIC ATTACK): ICD-10-CM

## 2019-06-21 DIAGNOSIS — I63.9 CRYPTOGENIC STROKE (HCC): ICD-10-CM

## 2019-06-21 DIAGNOSIS — I48.0 PAROXYSMAL ATRIAL FIBRILLATION (HCC): Primary | ICD-10-CM

## 2019-06-21 DIAGNOSIS — Z45.09 ENCOUNTER FOR ELECTRONIC ANALYSIS OF REVEAL EVENT RECORDER: ICD-10-CM

## 2019-06-21 DIAGNOSIS — I45.10 RBBB: ICD-10-CM

## 2019-06-21 PROCEDURE — 99214 OFFICE O/P EST MOD 30 MIN: CPT | Performed by: INTERNAL MEDICINE

## 2019-06-21 PROCEDURE — 93291 INTERROG DEV EVAL SCRMS IP: CPT | Performed by: INTERNAL MEDICINE

## 2019-07-16 PROBLEM — I48.0 PAROXYSMAL ATRIAL FIBRILLATION (HCC): Status: ACTIVE | Noted: 2018-01-01

## 2019-07-23 ENCOUNTER — NURSE ONLY (OUTPATIENT)
Dept: CARDIOLOGY CLINIC | Age: 79
End: 2019-07-23
Payer: MEDICARE

## 2019-07-23 DIAGNOSIS — I63.9 CRYPTOGENIC STROKE (HCC): ICD-10-CM

## 2019-07-23 DIAGNOSIS — Z45.09 ENCOUNTER FOR ELECTRONIC ANALYSIS OF REVEAL EVENT RECORDER: ICD-10-CM

## 2019-07-23 DIAGNOSIS — G45.9 TIA (TRANSIENT ISCHEMIC ATTACK): ICD-10-CM

## 2019-07-23 PROCEDURE — 93299 PR REM INTERROG ICPMS/SCRMS <30 D TECH REVIEW: CPT | Performed by: INTERNAL MEDICINE

## 2019-07-23 PROCEDURE — 93298 REM INTERROG DEV EVAL SCRMS: CPT | Performed by: INTERNAL MEDICINE

## 2019-08-27 ENCOUNTER — NURSE ONLY (OUTPATIENT)
Dept: CARDIOLOGY CLINIC | Age: 79
End: 2019-08-27
Payer: MEDICARE

## 2019-08-27 DIAGNOSIS — Z45.09 ENCOUNTER FOR ELECTRONIC ANALYSIS OF REVEAL EVENT RECORDER: ICD-10-CM

## 2019-08-27 DIAGNOSIS — I63.9 CRYPTOGENIC STROKE (HCC): ICD-10-CM

## 2019-08-27 PROCEDURE — 93299 PR REM INTERROG ICPMS/SCRMS <30 D TECH REVIEW: CPT | Performed by: INTERNAL MEDICINE

## 2019-08-27 PROCEDURE — 93298 REM INTERROG DEV EVAL SCRMS: CPT | Performed by: INTERNAL MEDICINE

## 2019-10-01 ENCOUNTER — NURSE ONLY (OUTPATIENT)
Dept: CARDIOLOGY CLINIC | Age: 79
End: 2019-10-01
Payer: MEDICARE

## 2019-10-01 DIAGNOSIS — Z45.09 ENCOUNTER FOR ELECTRONIC ANALYSIS OF REVEAL EVENT RECORDER: ICD-10-CM

## 2019-10-01 DIAGNOSIS — I63.9 CRYPTOGENIC STROKE (HCC): ICD-10-CM

## 2019-10-01 PROCEDURE — 93299 PR REM INTERROG ICPMS/SCRMS <30 D TECH REVIEW: CPT | Performed by: INTERNAL MEDICINE

## 2019-10-01 PROCEDURE — 93298 REM INTERROG DEV EVAL SCRMS: CPT | Performed by: INTERNAL MEDICINE

## 2019-11-05 ENCOUNTER — NURSE ONLY (OUTPATIENT)
Dept: CARDIOLOGY CLINIC | Age: 79
End: 2019-11-05
Payer: MEDICARE

## 2019-11-05 DIAGNOSIS — Z45.09 ENCOUNTER FOR ELECTRONIC ANALYSIS OF REVEAL EVENT RECORDER: ICD-10-CM

## 2019-11-05 DIAGNOSIS — I63.9 CRYPTOGENIC STROKE (HCC): ICD-10-CM

## 2019-11-05 PROCEDURE — 93299 PR REM INTERROG ICPMS/SCRMS <30 D TECH REVIEW: CPT | Performed by: INTERNAL MEDICINE

## 2019-11-05 PROCEDURE — 93298 REM INTERROG DEV EVAL SCRMS: CPT | Performed by: INTERNAL MEDICINE

## 2019-12-16 ENCOUNTER — OFFICE VISIT (OUTPATIENT)
Dept: CARDIOLOGY CLINIC | Age: 79
End: 2019-12-16
Payer: MEDICARE

## 2019-12-16 ENCOUNTER — NURSE ONLY (OUTPATIENT)
Dept: CARDIOLOGY CLINIC | Age: 79
End: 2019-12-16
Payer: MEDICARE

## 2019-12-16 VITALS
DIASTOLIC BLOOD PRESSURE: 66 MMHG | BODY MASS INDEX: 36.34 KG/M2 | WEIGHT: 232 LBS | SYSTOLIC BLOOD PRESSURE: 120 MMHG | HEART RATE: 64 BPM

## 2019-12-16 DIAGNOSIS — G45.9 TIA (TRANSIENT ISCHEMIC ATTACK): Primary | ICD-10-CM

## 2019-12-16 DIAGNOSIS — Z45.09 ENCOUNTER FOR ELECTRONIC ANALYSIS OF REVEAL EVENT RECORDER: ICD-10-CM

## 2019-12-16 DIAGNOSIS — I48.0 PAROXYSMAL ATRIAL FIBRILLATION (HCC): ICD-10-CM

## 2019-12-16 DIAGNOSIS — I45.10 RBBB: ICD-10-CM

## 2019-12-16 DIAGNOSIS — I63.9 CRYPTOGENIC STROKE (HCC): ICD-10-CM

## 2019-12-16 PROCEDURE — 93291 INTERROG DEV EVAL SCRMS IP: CPT | Performed by: INTERNAL MEDICINE

## 2019-12-16 PROCEDURE — 99214 OFFICE O/P EST MOD 30 MIN: CPT | Performed by: INTERNAL MEDICINE

## 2020-01-19 NOTE — PROGRESS NOTES
Remote interrogation of implanted cardiac event monitor shows normal function. Dx stroke. (AF recorded-OAC). Last  Recorded AF 11/14/19 x 1 hr. AF burden 0.1%. No symptom episodes recorded. Follow up 1 month via carelink.

## 2020-01-21 ENCOUNTER — NURSE ONLY (OUTPATIENT)
Dept: CARDIOLOGY CLINIC | Age: 80
End: 2020-01-21
Payer: MEDICARE

## 2020-01-21 PROCEDURE — 93298 REM INTERROG DEV EVAL SCRMS: CPT | Performed by: INTERNAL MEDICINE

## 2020-01-21 NOTE — LETTER
[address] [phone]  Luige Elliott [address] [facility] [phone]    Pacemaker/Defibrillator Clinic          01/20/20        Lavonne Patterson  [address] [place] [address]        Dear Lavonne Patterson    This letter is to inform you that we received the transmission from your monitor at home that checks your implanted heart device. The next date your monitor will automatically transmit will be 2/20. Last recorded AFib 11/19 x 1 hr. If your report needs attention we will notify you. Your device and monitor are wireless and most transmit cellularly, but please periodically check your monitor is still plugged in to the electrical outlet. If you still use the telephone land line to send please ensure the connection to the phone dot is secure. This will help to ensure successful automatic transmissions in the future. Also, the monitor needs to be close to you while sleeping at night. Please be aware that the remote device transmission sites are periodically monitored only during regular business hours during which simultaneous in-office device clinics are being run. If your transmission requires attention, we will contact you as soon as possible. Thank you.             [facility], [place]

## 2020-02-03 RX ORDER — RIVAROXABAN 20 MG/1
TABLET, FILM COATED ORAL
Qty: 90 TABLET | Refills: 3 | Status: SHIPPED | OUTPATIENT
Start: 2020-02-03 | End: 2021-01-28

## 2020-02-03 NOTE — TELEPHONE ENCOUNTER
[facility] Medication Refills:    Requested Prescriptions     Pending Prescriptions Disp Refills    XARELTO 20 MG TABS tablet [Pharmacy Med Name: Dylon Martinez 20MG] 90 tablet 3     Sig: TAKE 1 TABLET DAILY WITH BREAKFAST                   Last Office Visit12/16/19  Next Office Visit: 01/06/2021  Last Refill: 02/22/19  Last Labs: 07/17/19

## 2020-03-20 NOTE — PROGRESS NOTES
Remote interrogation of implanted cardiac event monitor shows normal function. Dx stroke. (AF recorded-OAC). Last  Recorded AF 11/14/19 x 1 hr. AF burden 0.1%. No new arrhythmias/events recorded. Follow up 1 month via carelink.

## 2020-03-23 ENCOUNTER — NURSE ONLY (OUTPATIENT)
Dept: CARDIOLOGY CLINIC | Age: 80
End: 2020-03-23
Payer: MEDICARE

## 2020-03-23 PROCEDURE — 93298 REM INTERROG DEV EVAL SCRMS: CPT | Performed by: INTERNAL MEDICINE

## 2020-03-23 PROCEDURE — G2066 INTER DEVC REMOTE 30D: HCPCS | Performed by: INTERNAL MEDICINE

## 2020-03-23 NOTE — LETTER
[address] [place] [employer] [phone]  Luige Elliott [address]- [address] [facility]    Pacemaker/Defibrillator Clinic          03/23/20        Marifer Oropeza  [address] [place] [address]        Dear Marifer Oropeza    This letter is to inform you that we received the transmission from your monitor at home that checks your implanted heart device. The next date your monitor will automatically transmit will be 4/23 No AFib recorded. If your report needs attention we will notify you. Your device and monitor are wireless and most transmit cellularly, but please periodically check your monitor is still plugged in to the electrical outlet. If you still use the telephone land line to send please ensure the connection to the phone dot is secure. This will help to ensure successful automatic transmissions in the future. Also, the monitor needs to be close to you while sleeping at night. Please be aware that the remote device transmission sites are periodically monitored only during regular business hours during which simultaneous in-office device clinics are being run. If your transmission requires attention, we will contact you as soon as possible. Thank you.             Padmini Flaherty

## 2020-04-23 ENCOUNTER — NURSE ONLY (OUTPATIENT)
Dept: CARDIOLOGY CLINIC | Age: 80
End: 2020-04-23
Payer: MEDICARE

## 2020-04-23 PROCEDURE — 93298 REM INTERROG DEV EVAL SCRMS: CPT | Performed by: INTERNAL MEDICINE

## 2020-04-23 PROCEDURE — G2066 INTER DEVC REMOTE 30D: HCPCS | Performed by: INTERNAL MEDICINE

## 2020-05-26 ENCOUNTER — NURSE ONLY (OUTPATIENT)
Dept: CARDIOLOGY CLINIC | Age: 80
End: 2020-05-26
Payer: MEDICARE

## 2020-05-26 PROCEDURE — G2066 INTER DEVC REMOTE 30D: HCPCS | Performed by: INTERNAL MEDICINE

## 2020-05-26 PROCEDURE — 93298 REM INTERROG DEV EVAL SCRMS: CPT | Performed by: INTERNAL MEDICINE

## 2020-06-25 ENCOUNTER — NURSE ONLY (OUTPATIENT)
Dept: CARDIOLOGY CLINIC | Age: 80
End: 2020-06-25

## 2020-06-25 PROCEDURE — 93298 REM INTERROG DEV EVAL SCRMS: CPT | Performed by: INTERNAL MEDICINE

## 2020-06-25 PROCEDURE — G2066 INTER DEVC REMOTE 30D: HCPCS | Performed by: INTERNAL MEDICINE

## 2020-06-25 NOTE — LETTER
[address] [facility] [phone]  Luige Elliott [address] [facility] [phone]    Pacemaker/Defibrillator Clinic          06/25/20        Geoff Maradiaga  [address] [facility] [address]        Dear Geoff Maradiaga    This letter is to inform you that we received the transmission from your monitor at home that checks your implanted heart device. The next date your monitor will automatically transmit will be 7/30. No AFib recorded. .  If your report needs attention we will notify you. Your device and monitor are wireless and most transmit cellularly, but please periodically check your monitor is still plugged in to the electrical outlet. If you still use the telephone land line to send please ensure the connection to the phone dot is secure. This will help to ensure successful automatic transmissions in the future. Also, the monitor needs to be close to you while sleeping at night. Please be aware that the remote device transmission sites are periodically monitored only during regular business hours during which simultaneous in-office device clinics are being run. If your transmission requires attention, we will contact you as soon as possible. Thank you.             [address]

## 2020-07-14 PROBLEM — E66.01 MORBIDLY OBESE (HCC): Status: ACTIVE | Noted: 2020-07-14

## 2020-07-30 ENCOUNTER — NURSE ONLY (OUTPATIENT)
Dept: CARDIOLOGY CLINIC | Age: 80
End: 2020-07-30
Payer: MEDICARE

## 2020-07-30 PROCEDURE — 93298 REM INTERROG DEV EVAL SCRMS: CPT | Performed by: INTERNAL MEDICINE

## 2020-07-30 PROCEDURE — G2066 INTER DEVC REMOTE 30D: HCPCS | Performed by: INTERNAL MEDICINE

## 2020-07-30 NOTE — PROGRESS NOTES
Remote interrogation of implanted cardiac event monitor shows normal function. Dx stroke. (AF recorded-OAC). Last  Recorded AF 11/14/19 x 1 hr. AF burden 0.1%. No new arrhythmias/events recorded.   Follow up 1 month via carelink

## 2020-07-30 NOTE — LETTER
[address] [employer] [phone]  Luige Elliott 10 [address]- [address] [facility]    Pacemaker/Defibrillator Clinic          07/30/20        Lynette Flores  [address] [facility] [address]        Dear Lynette Flores    This letter is to inform you that we received the transmission from your monitor at home that checks your implanted heart device. The next date your monitor will automatically transmit will be 9/3. No AFib recorded to date. .  If your report needs attention we will notify you. Your device and monitor are wireless and most transmit cellularly, but please periodically check your monitor is still plugged in to the electrical outlet. If you still use the telephone land line to send please ensure the connection to the phone dot is secure. This will help to ensure successful automatic transmissions in the future. Also, the monitor needs to be close to you while sleeping at night. Please be aware that the remote device transmission sites are periodically monitored only during regular business hours during which simultaneous in-office device clinics are being run. If your transmission requires attention, we will contact you as soon as possible. Thank you.             [facility] [address]

## 2020-10-08 ENCOUNTER — NURSE ONLY (OUTPATIENT)
Dept: CARDIOLOGY CLINIC | Age: 80
End: 2020-10-08
Payer: MEDICARE

## 2020-10-08 PROCEDURE — G2066 INTER DEVC REMOTE 30D: HCPCS | Performed by: INTERNAL MEDICINE

## 2020-10-08 PROCEDURE — 93298 REM INTERROG DEV EVAL SCRMS: CPT | Performed by: INTERNAL MEDICINE

## 2020-10-08 NOTE — PROGRESS NOTES
Remote interrogation of implanted cardiac event monitor shows normal function. Dx stroke. (AF recorded-OAC). Last  Recorded AF 11/14/19 x 1 hr. No new arrhythmias/events recorded.   Follow up 1 month via carelink

## 2020-11-11 ENCOUNTER — NURSE ONLY (OUTPATIENT)
Dept: CARDIOLOGY CLINIC | Age: 80
End: 2020-11-11
Payer: MEDICARE

## 2020-11-11 PROCEDURE — 93298 REM INTERROG DEV EVAL SCRMS: CPT | Performed by: INTERNAL MEDICINE

## 2020-11-11 PROCEDURE — G2066 INTER DEVC REMOTE 30D: HCPCS | Performed by: INTERNAL MEDICINE

## 2020-11-11 NOTE — LETTER
[address] [facility] [phone] [address] [facility]  Lashay Johansen [facility] [phone]    Pacemaker/Defibrillator Clinic          11/11/20        Fredy Gonzalez  [address] [place] [address]        Dear Fredy Gonzalez    This letter is to inform you that we received the transmission from your monitor at home that checks your implanted heart device. The next date your monitor will automatically transmit will be 12/16. No AFib recorded. .  If your report needs attention we will notify you. Your device and monitor are wireless and most transmit cellularly, but please periodically check your monitor is still plugged in to the electrical outlet. If you still use the telephone land line to send please ensure the connection to the phone dot is secure. This will help to ensure successful automatic transmissions in the future. Also, the monitor needs to be close to you while sleeping at night. Please be aware that the remote device transmission sites are periodically monitored only during regular business hours during which simultaneous in-office device clinics are being run. If your transmission requires attention, we will contact you as soon as possible. Thank you.             [address]

## 2020-12-15 NOTE — PROGRESS NOTES
ILR-Dx stroke. (AF recorded-OAC). Remote interrogation of implanted cardiac event monitor shows normal function. No  arrhythmias recorded. Ov 1/6  Follow up 1 month via carelink.

## 2020-12-16 ENCOUNTER — NURSE ONLY (OUTPATIENT)
Dept: CARDIOLOGY CLINIC | Age: 80
End: 2020-12-16
Payer: MEDICARE

## 2020-12-16 PROCEDURE — G2066 INTER DEVC REMOTE 30D: HCPCS | Performed by: INTERNAL MEDICINE

## 2020-12-16 PROCEDURE — 93298 REM INTERROG DEV EVAL SCRMS: CPT | Performed by: INTERNAL MEDICINE

## 2020-12-16 NOTE — LETTER
[address] [facility] [phone]  [address] [facility]  Lashay Johansen [facility] [phone]    Pacemaker/Defibrillator Clinic          12/17/20        Fredy Gonzalez  [address] [place] [address]        Dear Fredy Gonzalez    This letter is to inform you that we received the transmission from your monitor at home that checks your implanted heart device. The next date your monitor will automatically transmit will be 2/10/2021. No AFib recorded. .  If your report needs attention we will notify you. Your device and monitor are wireless and most transmit cellularly, but please periodically check your monitor is still plugged in to the electrical outlet. If you still use the telephone land line to send please ensure the connection to the phone dot is secure. This will help to ensure successful automatic transmissions in the future. Also, the monitor needs to be close to you while sleeping at night. Please be aware that the remote device transmission sites are periodically monitored only during regular business hours during which simultaneous in-office device clinics are being run. If your transmission requires attention, we will contact you as soon as possible. Thank you.             Toya

## 2020-12-17 ENCOUNTER — OFFICE VISIT (OUTPATIENT)
Dept: PRIMARY CARE CLINIC | Age: 80
End: 2020-12-17
Payer: MEDICARE

## 2020-12-17 PROCEDURE — 99211 OFF/OP EST MAY X REQ PHY/QHP: CPT | Performed by: NURSE PRACTITIONER

## 2020-12-17 NOTE — PROGRESS NOTES
Carmen Spar received a viral test for COVID-19. They were educated on isolation and quarantine as appropriate. For any symptoms, they were directed to seek care from their PCP, given contact information to establish with a doctor, directed to an urgent care or the emergency room.

## 2020-12-18 LAB — SARS-COV-2, NAA: NOT DETECTED

## 2020-12-30 NOTE — PROGRESS NOTES
[facility]   Electrophysiology  Office Visit  Date: 1/6/2021    Chief Complaint   Patient presents with    Cerebrovascular Accident    Hypertension       Cardiac HX: Geri Montilla is a [de-identified] y.o.  man with a h/o HTN, HLD, RBBB, TIA, seen by EP for eval of atrial arhythmia as a cause of TIA/CVA. Patient had originally presented [facility] on 5/1/2018 with sudden onset of slurred speech, facial droop and gait abnormality which lasted approximately 10 minutes. CT of the head showed no high-grade stenosis or focal occlusion and no evidence of acute CVA. S/p loop implant on 9/13/2018.   30 day MCOT (7/11/18 - 8/9/18) showed min HR of 50, avg of 76, max 120, occasional PACs, PVCs. No AF    Interval History/HPI: Patient is here for follow-up for monitoring for atrial arrhythmias and management of his ILR. He has not felt any heart racing or palpitations. He denies any chest pain, shortness of breath, PND, orthopnea or lower extremity edema. Overall he feels well. His blood pressure at home runs between 116-120/60-65. He occasionally gets dizzy going from sitting to standing and relates this to his blood pressure medication. He has not fallen or passed out. Home medications:   Current Outpatient Medications on File Prior to Visit   Medication Sig Dispense Refill    lisinopril (PRINIVIL;ZESTRIL) 10 MG tablet TAKE 1 TABLET DAILY 90 tablet 1    atorvastatin (LIPITOR) 40 MG tablet TAKE 1 TABLET NIGHTLY 90 tablet 3    triamterene-hydroCHLOROthiazide (DYAZIDE) 37.5-25 MG per capsule TAKE 1 CAPSULE DAILY (DUE FOR OFFICE VISIT WITH DR. MARION BEFORE MEDICATION RUNS OUT) 90 capsule 3    XARELTO 20 MG TABS tablet TAKE 1 TABLET DAILY WITH BREAKFAST 90 tablet 3    psyllium (KONSYL) 28.3 % PACK Take 1 packet by mouth 2 times daily       No current facility-administered medications on file prior to visit.         Past Medical History:   Diagnosis Date    GERD (gastroesophageal reflux disease) 2002 required with esoph dilat.  Gout of right ankle 2018    history of gout attacks x 3    High cholesterol     History of skin cancer     lots of sun exposure and .ho many precancers    HTN (hypertension)     Paroxysmal atrial fibrillation ([address] 2018    found on implanted loop recorder    Pulmonic stenosis 05/2018    mild seen on Echo 2018    RBBB     TIA (transient ischemic attack) 2018    presented with slurred speech        Past Surgical History:   Procedure Laterality Date    CATARACT REMOVAL Bilateral 8/13/2014    Dr. Bismark Keating Left 2/4/2013    Dr Aure Sosa       Allergies   Allergen Reactions    Acetaminophen        Social History:  Reviewed. reports that he has never smoked. He has never used smokeless tobacco. He reports that he does not drink alcohol or use drugs. Family History:  Reviewed. family history includes Heart Failure in his brother; Prostate Cancer in his brother. Review of System:    · Constitutional: No fevers, chills. · Eyes: No visual changes or diplopia. No scleral icterus. · ENT: No Headaches. No mouth sores or sore throat. · Cardiovascular: No for chest pain, No for dyspnea on exertion, No for palpitations or No for loss of consciousness. No cough, hemoptysis, No for pleuritic pain, or phlebitis. · Respiratory: No for cough or wheezing. No hematemesis. Gastrointestinal: No abdominal pain, blood in stools. · Genitourinary: No dysuria, or hematuria. · Musculoskeletal: No gait disturbance,    · Integumentary: No rash or pruritis. · Neurological: No headache, change in muscle strength, numbness or tingling. · Psychiatric: No anxiety, or depression. · Endocrine: No temperature intolerance. No excessive thirst, fluid intake, or urination. · Hem/Lymph: No abnormal bruising or bleeding, blood clots or swollen lymph nodes.   · Allergic/Immunologic: No nasal congestion or hives. Physical Examination:  Vitals:    01/06/21 0927   BP: 134/74   Pulse: 69   Temp: 97.1 °F (36.2 °C)         Wt Readings from Last 3 Encounters:   01/06/21 228 lb 9.6 oz (103.7 kg)   07/14/20 230 lb 9.6 oz (104.6 kg)   12/16/19 232 lb (105.2 kg)       · Constitutional: Oriented. No distress. · Head: Normocephalic and atraumatic. · Mouth/Throat: Oropharynx is clear and moist.   · Eyes: Conjunctivae clear without jaunduice. PERRL. · Neck: Neck supple. No rigidity. No JVD present. · Cardiovascular: Normal rate, regular rhythm, S1&S2. · Pulmonary/Chest: Bilateral respiratory sounds. No wheezes, No rhonchi. · Abdominal: Soft. Bowel sounds present. No distension, No tenderness. · Musculoskeletal: No tenderness. No edema    · Lymphadenopathy: Has no cervical adenopathy. · Neurological: Alert and oriented. Cranial nerve appears intact, No Gross deficit   · Skin: Skin is warm and dry. No rash noted. · Psychiatric: Has a normal mood, affect and behavior     Labs:  Reviewed. No results for input(s): NA, K, CL, CO2, PHOS, BUN, CREATININE in the last 72 hours. Invalid input(s): CA,  TSH  No results for input(s): WBC, HGB, HCT, MCV, PLT in the last 72 hours. Lab Results   Component Value Date    TROPONINI <0.01 05/01/2018     No results found for: BNP  Lab Results   Component Value Date    PROTIME 11.5 05/01/2018    PROTIME 10.6 01/30/2013    INR 1.2 09/13/2018    INR 1.02 05/01/2018    INR 0.93 01/30/2013     Lab Results   Component Value Date    CHOL 130 07/15/2020    HDL 47 07/15/2020    TRIG 126 07/15/2020       ECG: Personally reviewed:  NSR, HR 69, , , QTc 413    ECHO:  5/2/2018  Summary   Normal LV size and systolic function. Estimated ejection fraction is 60%. Mild pulmonic stenosis. Trivial mitral regurgitation is present. The left atrium is normal in size. The right ventricle is normal in size and function. Mild tricuspid regurgitation.      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    TIA (transient ischemic attack) 05/01/2018    Pulmonic stenosis 05/01/2018    Paroxysmal atrial fibrillation ([address] 01/01/2018    Hyperlipidemia LDL goal <130 12/30/2016    Obesity (BMI 30-39.9) 06/13/2016    Family history of prostate cancer 06/13/2016    Status post total left knee replacement 03/05/2013    Essential hypertension 01/22/2013    Gastroesophageal reflux disease without esophagitis 01/22/2013    Primary osteoarthritis of right knee 10/23/2012    Osteoarthritis of left knee 10/23/2012    GERD (gastroesophageal reflux disease) 01/01/2002        Assessment:   1. Cerebrovascular accident (CVA), unspecified mechanism ([address]    2. Status post placement of implantable loop recorder    3. Essential hypertension         Cardiac HX: Andrew Lofton is a [de-identified] y.o. man with a h/o HTN, HLD, RBBB, TIA, seen by EP for eval of atrial arhythmia as a cause of CVA/TIA.     TIA  - 30 day monitor MCOT (7/11/18 - 8/9/18) showed min HR of 50, avg of 76, max 120, occasional PACs, PVCs. No AF.  - S/p MDT loop implant 9/13/2018, Dr. Krishna Chicas check today shows NSR, no arrhythmias or patient activated events. AF settings changed to all.     Atherosclerotic disease  - CT of the neck showed some atherosclerosis in the aortic arch and bilateral subclavian arteries  - Consider stress test if s/s   - ECG ordered and results personally reviewed      HTN  - Controlled in the office today.    - On lisinopril 10 mg daily, Dyazide 37.5-25 mg daily  - Reviewed labs done in July - stable    EF of 47%  No systolic HF  No known CAD  No known AF   No Tobacco use.       All questions and concerns were addressed to the patient/family. Alternatives to my treatment were discussed. The note was completed using EMR.  Every effort was made to ensure accuracy; however, inadvertent computerized transcription errors may be present.      Patient received education regarding their diagnosis, treatment and medications while in the office today.       Bella Chairez CNP  [address]

## 2021-01-06 ENCOUNTER — OFFICE VISIT (OUTPATIENT)
Dept: CARDIOLOGY CLINIC | Age: 81
End: 2021-01-06
Payer: MEDICARE

## 2021-01-06 ENCOUNTER — NURSE ONLY (OUTPATIENT)
Dept: CARDIOLOGY CLINIC | Age: 81
End: 2021-01-06
Payer: MEDICARE

## 2021-01-06 VITALS
TEMPERATURE: 97.1 F | HEIGHT: 67 IN | DIASTOLIC BLOOD PRESSURE: 74 MMHG | WEIGHT: 228.6 LBS | SYSTOLIC BLOOD PRESSURE: 134 MMHG | HEART RATE: 69 BPM | BODY MASS INDEX: 35.88 KG/M2

## 2021-01-06 DIAGNOSIS — I63.9 CEREBROVASCULAR ACCIDENT (CVA), UNSPECIFIED MECHANISM (HCC): Primary | ICD-10-CM

## 2021-01-06 DIAGNOSIS — I45.10 RBBB: ICD-10-CM

## 2021-01-06 DIAGNOSIS — G45.9 TIA (TRANSIENT ISCHEMIC ATTACK): ICD-10-CM

## 2021-01-06 DIAGNOSIS — Z45.09 ENCOUNTER FOR ELECTRONIC ANALYSIS OF REVEAL EVENT RECORDER: ICD-10-CM

## 2021-01-06 DIAGNOSIS — I48.0 PAROXYSMAL ATRIAL FIBRILLATION (HCC): ICD-10-CM

## 2021-01-06 DIAGNOSIS — I10 ESSENTIAL HYPERTENSION: ICD-10-CM

## 2021-01-06 DIAGNOSIS — Z95.818 STATUS POST PLACEMENT OF IMPLANTABLE LOOP RECORDER: ICD-10-CM

## 2021-01-06 DIAGNOSIS — I63.9 CRYPTOGENIC STROKE (HCC): ICD-10-CM

## 2021-01-06 PROCEDURE — 99214 OFFICE O/P EST MOD 30 MIN: CPT | Performed by: NURSE PRACTITIONER

## 2021-01-06 PROCEDURE — 93000 ELECTROCARDIOGRAM COMPLETE: CPT | Performed by: NURSE PRACTITIONER

## 2021-01-06 PROCEDURE — 93291 INTERROG DEV EVAL SCRMS IP: CPT | Performed by: INTERNAL MEDICINE

## 2021-01-28 RX ORDER — RIVAROXABAN 20 MG/1
TABLET, FILM COATED ORAL
Qty: 90 TABLET | Refills: 3 | Status: SHIPPED | OUTPATIENT
Start: 2021-01-28 | End: 2022-01-24

## 2021-01-28 NOTE — TELEPHONE ENCOUNTER
Requested Prescriptions     Pending Prescriptions Disp Refills    XARELTO 20 MG TABS tablet [Pharmacy Med Name: Connie Ibrahim 20MG] 90 tablet 3     Sig: TAKE 1 TABLET DAILY WITH BREAKFAST                  Last Office Visit: 1/6/2021     Next Office Visit: 2/10/2021         Last Labs: 01/26/2021

## 2021-02-04 DIAGNOSIS — E78.5 HYPERLIPIDEMIA LDL GOAL <130: ICD-10-CM

## 2021-02-04 DIAGNOSIS — Z12.5 SPECIAL SCREENING FOR MALIGNANT NEOPLASM OF PROSTATE: ICD-10-CM

## 2021-02-04 LAB
A/G RATIO: 1.3 (ref 1.1–2.2)
ALBUMIN SERPL-MCNC: 3.7 G/DL (ref 3.4–5)
ALP BLD-CCNC: 74 U/L (ref 40–129)
ALT SERPL-CCNC: 12 U/L (ref 10–40)
ANION GAP SERPL CALCULATED.3IONS-SCNC: 8 MMOL/L (ref 3–16)
AST SERPL-CCNC: 19 U/L (ref 15–37)
BASOPHILS ABSOLUTE: 0.1 K/UL (ref 0–0.2)
BASOPHILS RELATIVE PERCENT: 1.8 %
BILIRUB SERPL-MCNC: 0.7 MG/DL (ref 0–1)
BUN BLDV-MCNC: 10 MG/DL (ref 7–20)
CALCIUM SERPL-MCNC: 9 MG/DL (ref 8.3–10.6)
CHLORIDE BLD-SCNC: 96 MMOL/L (ref 99–110)
CHOLESTEROL, TOTAL: 124 MG/DL (ref 0–199)
CO2: 31 MMOL/L (ref 21–32)
CREAT SERPL-MCNC: 0.8 MG/DL (ref 0.8–1.3)
EOSINOPHILS ABSOLUTE: 0.2 K/UL (ref 0–0.6)
EOSINOPHILS RELATIVE PERCENT: 2.7 %
GFR AFRICAN AMERICAN: >60
GFR NON-AFRICAN AMERICAN: >60
GLOBULIN: 2.8 G/DL
GLUCOSE BLD-MCNC: 82 MG/DL (ref 70–99)
HCT VFR BLD CALC: 41.7 % (ref 40.5–52.5)
HDLC SERPL-MCNC: 48 MG/DL (ref 40–60)
HEMOGLOBIN: 14 G/DL (ref 13.5–17.5)
LDL CHOLESTEROL CALCULATED: 55 MG/DL
LYMPHOCYTES ABSOLUTE: 1.7 K/UL (ref 1–5.1)
LYMPHOCYTES RELATIVE PERCENT: 25.6 %
MCH RBC QN AUTO: 28.4 PG (ref 26–34)
MCHC RBC AUTO-ENTMCNC: 33.6 G/DL (ref 31–36)
MCV RBC AUTO: 84.6 FL (ref 80–100)
MONOCYTES ABSOLUTE: 0.6 K/UL (ref 0–1.3)
MONOCYTES RELATIVE PERCENT: 9.9 %
NEUTROPHILS ABSOLUTE: 3.9 K/UL (ref 1.7–7.7)
NEUTROPHILS RELATIVE PERCENT: 60 %
PDW BLD-RTO: 13.7 % (ref 12.4–15.4)
PLATELET # BLD: 212 K/UL (ref 135–450)
PMV BLD AUTO: 9.6 FL (ref 5–10.5)
POTASSIUM SERPL-SCNC: 4 MMOL/L (ref 3.5–5.1)
PROSTATE SPECIFIC ANTIGEN: 0.25 NG/ML (ref 0–4)
RBC # BLD: 4.93 M/UL (ref 4.2–5.9)
SODIUM BLD-SCNC: 135 MMOL/L (ref 136–145)
TOTAL PROTEIN: 6.5 G/DL (ref 6.4–8.2)
TRIGL SERPL-MCNC: 107 MG/DL (ref 0–150)
TSH SERPL DL<=0.05 MIU/L-ACNC: 2.32 UIU/ML (ref 0.27–4.2)
VLDLC SERPL CALC-MCNC: 21 MG/DL
WBC # BLD: 6.5 K/UL (ref 4–11)

## 2021-02-10 ENCOUNTER — NURSE ONLY (OUTPATIENT)
Dept: CARDIOLOGY CLINIC | Age: 81
End: 2021-02-10
Payer: MEDICARE

## 2021-02-10 DIAGNOSIS — I48.0 PAF (PAROXYSMAL ATRIAL FIBRILLATION) (HCC): ICD-10-CM

## 2021-02-10 DIAGNOSIS — I63.9 CRYPTOGENIC STROKE (HCC): ICD-10-CM

## 2021-02-10 DIAGNOSIS — Z45.09 ENCOUNTER FOR ELECTRONIC ANALYSIS OF REVEAL EVENT RECORDER: ICD-10-CM

## 2021-02-10 PROCEDURE — 93298 REM INTERROG DEV EVAL SCRMS: CPT | Performed by: INTERNAL MEDICINE

## 2021-02-10 PROCEDURE — G2066 INTER DEVC REMOTE 30D: HCPCS | Performed by: INTERNAL MEDICINE

## 2021-02-10 NOTE — PROGRESS NOTES
ILR for stroke. (AF recorded-OAC). Last  Recorded AF 11/14/19 x 1 hr.  Remote interrogation of implanted cardiac event monitor shows normal function. No AFib recorded. Follow up 1 month via carelink.

## 2021-03-17 ENCOUNTER — NURSE ONLY (OUTPATIENT)
Dept: CARDIOLOGY CLINIC | Age: 81
End: 2021-03-17
Payer: MEDICARE

## 2021-03-17 DIAGNOSIS — I63.9 CRYPTOGENIC STROKE (HCC): ICD-10-CM

## 2021-03-17 DIAGNOSIS — Z45.09 ENCOUNTER FOR ELECTRONIC ANALYSIS OF REVEAL EVENT RECORDER: ICD-10-CM

## 2021-03-17 PROCEDURE — G2066 INTER DEVC REMOTE 30D: HCPCS | Performed by: INTERNAL MEDICINE

## 2021-03-17 PROCEDURE — 93298 REM INTERROG DEV EVAL SCRMS: CPT | Performed by: INTERNAL MEDICINE

## 2021-03-17 NOTE — PROGRESS NOTES
ILR for stroke. (AF recorded-OAC). Last  Recorded AF 11/14/19 x 1 hr.  Remote interrogation of implanted cardiac event monitor shows normal function. No new arrhythmias/events recorded. Follow up 1 month via carelink.

## 2021-04-08 ENCOUNTER — TELEPHONE (OUTPATIENT)
Dept: CARDIOLOGY CLINIC | Age: 81
End: 2021-04-08

## 2021-04-08 NOTE — TELEPHONE ENCOUNTER
Patient called to find out when his next remote check is. He did not receive his letter through My Chart.  Mailed him letter dated 3/18/21 per his request.

## 2021-04-21 ENCOUNTER — NURSE ONLY (OUTPATIENT)
Dept: CARDIOLOGY CLINIC | Age: 81
End: 2021-04-21
Payer: MEDICARE

## 2021-04-21 DIAGNOSIS — Z45.09 ENCOUNTER FOR ELECTRONIC ANALYSIS OF REVEAL EVENT RECORDER: ICD-10-CM

## 2021-04-21 DIAGNOSIS — I63.9 CRYPTOGENIC STROKE (HCC): ICD-10-CM

## 2021-04-21 NOTE — PROGRESS NOTES
ILR for stroke. (AF recorded-OAC). Remote interrogation of implanted cardiac event monitor shows normal function. Longest AF (last 90 days): (ID# [id number]) 13-Mar-2021, Duration: 02:20:00. V rate 100-150 bpm.  AF burden last month 0.2%. He was also symptomatic w/ the AF on 3/13. Follow up 1 month via [employer]. DR Sukhdeep Del Valle WILL REVIEW.

## 2021-04-23 PROCEDURE — G2066 INTER DEVC REMOTE 30D: HCPCS | Performed by: INTERNAL MEDICINE

## 2021-04-23 PROCEDURE — 93298 REM INTERROG DEV EVAL SCRMS: CPT | Performed by: INTERNAL MEDICINE

## 2021-05-26 ENCOUNTER — NURSE ONLY (OUTPATIENT)
Dept: CARDIOLOGY CLINIC | Age: 81
End: 2021-05-26
Payer: MEDICARE

## 2021-05-26 DIAGNOSIS — I63.9 CRYPTOGENIC STROKE (HCC): ICD-10-CM

## 2021-05-26 DIAGNOSIS — Z45.09 ENCOUNTER FOR ELECTRONIC ANALYSIS OF REVEAL EVENT RECORDER: ICD-10-CM

## 2021-05-29 PROCEDURE — 93298 REM INTERROG DEV EVAL SCRMS: CPT | Performed by: INTERNAL MEDICINE

## 2021-05-29 PROCEDURE — G2066 INTER DEVC REMOTE 30D: HCPCS | Performed by: INTERNAL MEDICINE

## 2021-06-30 ENCOUNTER — NURSE ONLY (OUTPATIENT)
Dept: CARDIOLOGY CLINIC | Age: 81
End: 2021-06-30
Payer: MEDICARE

## 2021-06-30 DIAGNOSIS — Z45.09 ENCOUNTER FOR ELECTRONIC ANALYSIS OF REVEAL EVENT RECORDER: ICD-10-CM

## 2021-06-30 DIAGNOSIS — I63.9 CRYPTOGENIC STROKE (HCC): ICD-10-CM

## 2021-07-01 NOTE — PROGRESS NOTES
Remote transmission received for patients ILR. EP physician will review. See interrogation under the cardiology tab in the [address] [facility] [address] field for more details. Will continue to monitor remotely. ILR for stroke. (AF recorded-OAC). No new arrhythmias/events recorded.

## 2021-07-06 PROCEDURE — G2066 INTER DEVC REMOTE 30D: HCPCS | Performed by: INTERNAL MEDICINE

## 2021-07-06 PROCEDURE — 93298 REM INTERROG DEV EVAL SCRMS: CPT | Performed by: INTERNAL MEDICINE

## 2021-08-04 ENCOUNTER — NURSE ONLY (OUTPATIENT)
Dept: CARDIOLOGY CLINIC | Age: 81
End: 2021-08-04

## 2021-08-04 DIAGNOSIS — I63.9 CRYPTOGENIC STROKE (HCC): ICD-10-CM

## 2021-08-04 DIAGNOSIS — Z45.09 ENCOUNTER FOR ELECTRONIC ANALYSIS OF REVEAL EVENT RECORDER: ICD-10-CM

## 2021-08-04 PROCEDURE — 93298 REM INTERROG DEV EVAL SCRMS: CPT | Performed by: INTERNAL MEDICINE

## 2021-08-04 PROCEDURE — G2066 INTER DEVC REMOTE 30D: HCPCS | Performed by: INTERNAL MEDICINE

## 2021-08-05 NOTE — PROGRESS NOTES
ILR for stroke. (AF recorded-OAC). We received a remote transmission from patient's monitor at home. Remote Linq report shows no arrhythmias. EP physician to review.  We will continue to monitor remotely.

## 2021-09-08 ENCOUNTER — NURSE ONLY (OUTPATIENT)
Dept: CARDIOLOGY CLINIC | Age: 81
End: 2021-09-08
Payer: MEDICARE

## 2021-09-08 DIAGNOSIS — Z45.09 ENCOUNTER FOR ELECTRONIC ANALYSIS OF REVEAL EVENT RECORDER: ICD-10-CM

## 2021-09-08 DIAGNOSIS — I48.0 PAROXYSMAL ATRIAL FIBRILLATION (HCC): ICD-10-CM

## 2021-09-08 DIAGNOSIS — I63.9 CRYPTOGENIC STROKE (HCC): ICD-10-CM

## 2021-09-08 PROCEDURE — G2066 INTER DEVC REMOTE 30D: HCPCS | Performed by: INTERNAL MEDICINE

## 2021-09-08 PROCEDURE — 93298 REM INTERROG DEV EVAL SCRMS: CPT | Performed by: INTERNAL MEDICINE

## 2021-10-13 ENCOUNTER — NURSE ONLY (OUTPATIENT)
Dept: CARDIOLOGY CLINIC | Age: 81
End: 2021-10-13
Payer: MEDICARE

## 2021-10-13 DIAGNOSIS — Z45.09 ENCOUNTER FOR ELECTRONIC ANALYSIS OF REVEAL EVENT RECORDER: ICD-10-CM

## 2021-10-13 DIAGNOSIS — I63.9 CRYPTOGENIC STROKE (HCC): ICD-10-CM

## 2021-10-13 PROCEDURE — 93298 REM INTERROG DEV EVAL SCRMS: CPT | Performed by: INTERNAL MEDICINE

## 2021-10-13 PROCEDURE — G2066 INTER DEVC REMOTE 30D: HCPCS | Performed by: INTERNAL MEDICINE

## 2021-10-13 NOTE — PROGRESS NOTES
ILR for stroke (AF recorded-OAC). We received a remote transmission from patient's monitor at home. Remote Linq report shows no arrhythmias. EP physician to review. We will continue to monitor remotely.

## 2021-10-13 NOTE — PROGRESS NOTES
[address]   Electrophysiology  Office Visit  Date: 10/21/2021    Chief Complaint   Patient presents with    Cerebrovascular Accident    Atrial Fibrillation    Hypertension    Congestive Heart Failure       Cardiac HX: Jessica Gonzáles is a 80 y.o. man with a h/o HTN, HLD, RBBB, TIA/CVA (5/1/2018), s/p 30 day MCOT (7/11/18 - 8/9/18) that showed an avg HR 76, (), occasional PACs, PVCs, no AF, s/p ILR (9/13/2018, Dr. Veda Gibbons), pAF noted on device, now on Xarelto, asymptomatic with episodes. Interval History/HPI: Patient is here for f/u for CVA, pAF and ILR mgmnt. Patient had originally presented to [facility] [place] on 5/1/2018 with a sudden onset of slurred speech, facial droop and gait abnormality which lasted approximately 10 minutes. CT of the head showed no high-grade stenosis or focal occlusion and no evidence of acute CVA. He did undergo an ILR implant which has shown pAF. Last episode was on March 13 starting at 11:30 in the morning and lasting for 2 hours and 20 minutes. Patient was asymptomatic. He is on Xarelto, has not missed any doses and denies any issues with bleeding or dark tarry stools. His blood pressure is well controlled in the office today. He does have chronic lower extremity edema that he states is related to congestive heart failure. He does wear support hose in the winter however not in the summer. He denies any chest discomfort, shortness of breath, PND orthopnea.       Home medications:   Current Outpatient Medications on File Prior to Visit   Medication Sig Dispense Refill    atorvastatin (LIPITOR) 40 MG tablet TAKE 1 TABLET NIGHTLY 90 tablet 3    omeprazole (PRILOSEC) 20 MG delayed release capsule Take 20 mg by mouth daily      ipratropium (ATROVENT) 0.03 % nasal spray 2 sprays by Each Nostril route every 12 hours 1 Bottle 3    lisinopril (PRINIVIL;ZESTRIL) 10 MG tablet TAKE 1 TABLET DAILY 90 tablet 3    triamterene-hydroCHLOROthiazide (DYAZIDE) 37.5-25 MG per capsule TAKE 1 CAPSULE DAILY (DUE FOR OFFICE VISIT WITH DR. MARION BEFORE MEDICATION RUNS OUT) 90 capsule 3    XARELTO 20 MG TABS tablet TAKE 1 TABLET DAILY WITH BREAKFAST 90 tablet 3     No current facility-administered medications on file prior to visit. Past Medical History:   Diagnosis Date    Chronic eczematous otitis externa of both ears     GERD (gastroesophageal reflux disease) 2002    required with esoph dilat.  Gout of right ankle 2018    history of gout attacks x 3    High cholesterol     History of skin cancer     lots of sun exposure and .ho many precancers    HTN (hypertension)     Paroxysmal atrial fibrillation ([address] 2018    found on implanted loop recorder    Primary osteoarthritis of right knee     severe    Pulmonic stenosis 05/2018    mild seen on Echo 2018    RBBB     TIA (transient ischemic attack) 2018    presented with slurred speech        Past Surgical History:   Procedure Laterality Date    CATARACT REMOVAL Bilateral 8/13/2014    Dr. Fairbanks Appl Left 2/4/2013    Dr Trish Sanchez       Allergies   Allergen Reactions    Acetaminophen        Social History:  Reviewed. reports that he has never smoked. He has never used smokeless tobacco. He reports that he does not drink alcohol and does not use drugs. Family History:  Reviewed. family history includes Heart Failure in his brother; Prostate Cancer in his brother. Review of System:    · Constitutional: No fevers, chills. · Eyes: No visual changes or diplopia. No scleral icterus. · ENT: No Headaches. No mouth sores or sore throat. · Cardiovascular: No for chest pain, No for dyspnea on exertion, No for palpitations or No for loss of consciousness. No cough, hemoptysis, No for pleuritic pain, or phlebitis. · Respiratory: No for cough or wheezing. No hematemesis.   Gastrointestinal: No abdominal pain, blood in stools. · Genitourinary: No dysuria, or hematuria. · Musculoskeletal: No gait disturbance,    · Integumentary: No rash or pruritis. · Neurological: No headache, change in muscle strength, numbness or tingling. · Psychiatric: No anxiety, or depression. · Endocrine: No temperature intolerance. No excessive thirst, fluid intake, or urination. · Hem/Lymph: No abnormal bruising or bleeding, blood clots or swollen lymph nodes. · Allergic/Immunologic: No nasal congestion or hives. Physical Examination:  Vitals:    10/21/21 1008   BP: 122/64   Pulse: 76         Wt Readings from Last 3 Encounters:   10/21/21 227 lb 6.4 oz (103.1 kg)   07/26/21 228 lb 3.2 oz (103.5 kg)   01/26/21 229 lb (103.9 kg)       · Constitutional: Oriented. No distress. · Head: Normocephalic and atraumatic. · Mouth/Throat: Oropharynx is clear and moist.   · Eyes: Conjunctivae clear without jaunduice. PERRL. · Neck: Neck supple. No rigidity. No JVD present. · Cardiovascular: Normal rate, regular rhythm, S1&S2. · Pulmonary/Chest: Bilateral respiratory sounds. No wheezes, No rhonchi. · Abdominal: Soft. Bowel sounds present. No distension, No tenderness. · Musculoskeletal: No tenderness. No edema    · Lymphadenopathy: Has no cervical adenopathy. · Neurological: Alert and oriented. Cranial nerve appears intact, No Gross deficit   · Skin: Skin is warm and dry. No rash noted. · Psychiatric: Has a normal mood, affect and behavior     Labs:  Reviewed. No results for input(s): NA, K, CL, CO2, PHOS, BUN, CREATININE in the last 72 hours. Invalid input(s): CA,  TSH  No results for input(s): WBC, HGB, HCT, MCV, PLT in the last 72 hours.   Lab Results   Component Value Date    TROPONINI <0.01 05/01/2018     No results found for: BNP  Lab Results   Component Value Date    PROTIME 11.5 05/01/2018    PROTIME 10.6 01/30/2013    INR 1.2 09/13/2018    INR 1.02 05/01/2018    INR 0.93 01/30/2013     Lab Results   Component Value Date CHOL 124 02/04/2021    HDL 48 02/04/2021    TRIG 107 02/04/2021       ECG: Personally reviewed:  NSR, HR every 3, , , QTc 404    ECHO:  5/2/2018  Summary   Normal LV size and systolic function. Estimated ejection fraction is 60%. Mild pulmonic stenosis. Trivial mitral regurgitation is present. The left atrium is normal in size. The right ventricle is normal in size and function. Mild tricuspid regurgitation. 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 Cryptogenic stroke ([address]    2. TIA (transient ischemic attack)    3. Paroxysmal atrial fibrillation (HCC)    4. Chronic diastolic CHF (congestive heart failure) ([address]    5. Benign essential HTN         Cardiac HX: Greyson Powell is a 80 y.o. man with a h/o HTN, HLD, RBBB, TIA/CVA (5/1/2018), s/p 30 day MCOT (7/11/18 - 8/9/18) that showed an avg HR 76, (), occasional PACs, PVCs, no AF, s/p ILR (9/13/2018, Dr. Smiley Rey), pAF noted on device, now on Xarelto, asymptomatic with episodes. [id number] 5. TSH 2.32 (4/20/2021).      pAF  - In NSR  - AF noted on ILR - last episode 3/13/2021 lasting 2 hours and 20 min in length - asymptomatic  - On Xarelto 20 mg - no s/s bleeding - continue  - Will check a sleep study  - ECG ordered and results personally reviewed    TIA  - 30 day monitor MCOT (7/11/18 - 8/9/18) showed min HR of 50, avg of 76, max 120, occasional PACs, PVCs. No AF.  - S/p MDT loop implant 9/13/2018, Dr. Shawna Burton check today shows NSR, no arrhythmias or patient activated events.       Chronic dCHF  - EF 60%  - 1+ bilat pitting LE edema  - Will check an echo     HTN  - Controlled in the office today. - On lisinopril 10 mg daily, Dyazide 37.5-25 mg daily  - Reviewed labs done in Feb - stable    F/u in 6 months    EF of 76%  No systolic HF  No known CAD  No known AF   No Tobacco use.       All questions and concerns were addressed to the patient/family. Alternatives to my treatment were discussed. The note was completed using EMR.  Every effort was made to ensure accuracy; however, inadvertent computerized transcription errors may be present.      Patient received education regarding their diagnosis, treatment and medications while in the office today.       Gregorio Duran CNP  [address]

## 2021-10-21 ENCOUNTER — OFFICE VISIT (OUTPATIENT)
Dept: CARDIOLOGY CLINIC | Age: 81
End: 2021-10-21
Payer: MEDICARE

## 2021-10-21 ENCOUNTER — NURSE ONLY (OUTPATIENT)
Dept: CARDIOLOGY CLINIC | Age: 81
End: 2021-10-21

## 2021-10-21 VITALS
DIASTOLIC BLOOD PRESSURE: 64 MMHG | HEIGHT: 67 IN | BODY MASS INDEX: 35.69 KG/M2 | HEART RATE: 76 BPM | WEIGHT: 227.4 LBS | SYSTOLIC BLOOD PRESSURE: 122 MMHG

## 2021-10-21 DIAGNOSIS — I63.9 CRYPTOGENIC STROKE (HCC): Primary | ICD-10-CM

## 2021-10-21 DIAGNOSIS — G45.9 TIA (TRANSIENT ISCHEMIC ATTACK): ICD-10-CM

## 2021-10-21 DIAGNOSIS — Z45.09 ENCOUNTER FOR ELECTRONIC ANALYSIS OF REVEAL EVENT RECORDER: ICD-10-CM

## 2021-10-21 DIAGNOSIS — I63.9 CRYPTOGENIC STROKE (HCC): ICD-10-CM

## 2021-10-21 DIAGNOSIS — I50.32 CHRONIC DIASTOLIC CHF (CONGESTIVE HEART FAILURE) (HCC): ICD-10-CM

## 2021-10-21 DIAGNOSIS — I48.0 PAROXYSMAL ATRIAL FIBRILLATION (HCC): ICD-10-CM

## 2021-10-21 DIAGNOSIS — I10 BENIGN ESSENTIAL HTN: ICD-10-CM

## 2021-10-21 PROCEDURE — 93000 ELECTROCARDIOGRAM COMPLETE: CPT | Performed by: NURSE PRACTITIONER

## 2021-10-21 PROCEDURE — 99214 OFFICE O/P EST MOD 30 MIN: CPT | Performed by: NURSE PRACTITIONER

## 2021-10-21 NOTE — PROGRESS NOTES
Patient comes into the office today for a device check and ov w/NPFW. [employer] [id number] implanted 9/13/2018 by REMY  Hx: cryptogenic stroke  Interrogation of implanted cardiac monitor shows normal device function. Presenting NSR @ 80-85 bpm  0 episodes since last remote check 10/13/2021  AF [place] 0%  Patient currently takes [address] report under the Cardiology tab. Patient will follow up in 1 month via carelink.

## 2021-11-17 ENCOUNTER — NURSE ONLY (OUTPATIENT)
Dept: CARDIOLOGY CLINIC | Age: 81
End: 2021-11-17
Payer: MEDICARE

## 2021-11-17 DIAGNOSIS — I63.9 CRYPTOGENIC STROKE (HCC): ICD-10-CM

## 2021-11-17 DIAGNOSIS — Z45.09 ENCOUNTER FOR ELECTRONIC ANALYSIS OF REVEAL EVENT RECORDER: ICD-10-CM

## 2021-11-19 PROCEDURE — 93298 REM INTERROG DEV EVAL SCRMS: CPT | Performed by: INTERNAL MEDICINE

## 2021-11-19 PROCEDURE — G2066 INTER DEVC REMOTE 30D: HCPCS | Performed by: INTERNAL MEDICINE

## 2021-12-22 ENCOUNTER — NURSE ONLY (OUTPATIENT)
Dept: CARDIOLOGY CLINIC | Age: 81
End: 2021-12-22
Payer: MEDICARE

## 2021-12-22 DIAGNOSIS — I63.9 CRYPTOGENIC STROKE (HCC): ICD-10-CM

## 2021-12-22 DIAGNOSIS — Z45.09 ENCOUNTER FOR ELECTRONIC ANALYSIS OF REVEAL EVENT RECORDER: ICD-10-CM

## 2021-12-23 PROCEDURE — 93298 REM INTERROG DEV EVAL SCRMS: CPT | Performed by: INTERNAL MEDICINE

## 2021-12-23 PROCEDURE — G2066 INTER DEVC REMOTE 30D: HCPCS | Performed by: INTERNAL MEDICINE

## 2021-12-23 NOTE — PROGRESS NOTES
ILR for stroke (AF recorded-Xarelto). We received a remote transmission from patient's monitor at home. Remote Linq report shows no arrhythmias. EP physician to review. We will continue to monitor remotely.

## 2022-01-24 RX ORDER — RIVAROXABAN 20 MG/1
TABLET, FILM COATED ORAL
Qty: 90 TABLET | Refills: 3 | Status: SHIPPED | OUTPATIENT
Start: 2022-01-24

## 2022-01-24 NOTE — TELEPHONE ENCOUNTER
Requested Prescriptions     Pending Prescriptions Disp Refills    XARELTO 20 MG TABS tablet [Pharmacy Med Name: Gudelia Silva 20MG] 90 tablet 3     Sig: TAKE 1 TABLET DAILY WITH BREAKFAST                  Last Office Visit: 10/21/2021     Next Office Visit: 4/27/2022      Last Labs: 4/21/2021

## 2022-01-26 ENCOUNTER — NURSE ONLY (OUTPATIENT)
Dept: CARDIOLOGY CLINIC | Age: 82
End: 2022-01-26
Payer: MEDICARE

## 2022-01-26 DIAGNOSIS — Z45.09 ENCOUNTER FOR ELECTRONIC ANALYSIS OF REVEAL EVENT RECORDER: ICD-10-CM

## 2022-01-26 DIAGNOSIS — I63.9 CRYPTOGENIC STROKE (HCC): ICD-10-CM

## 2022-01-29 PROCEDURE — 93298 REM INTERROG DEV EVAL SCRMS: CPT | Performed by: INTERNAL MEDICINE

## 2022-01-29 PROCEDURE — G2066 INTER DEVC REMOTE 30D: HCPCS | Performed by: INTERNAL MEDICINE

## 2022-01-29 NOTE — PROGRESS NOTES
ILR for stroke (AF recorded-Xarelto). We received a remote transmission from patient's monitor at home. Battery RRT since 01.20.22-office staff notified. Remote Linq report shows no arrhythmias. EP physician to review. We will continue to monitor remotely.

## 2022-02-02 ENCOUNTER — TELEPHONE (OUTPATIENT)
Dept: CARDIOLOGY CLINIC | Age: 82
End: 2022-02-02

## 2022-02-02 NOTE — TELEPHONE ENCOUNTER
Battery RRT 01.20.22; has NPFW OV/device 04.27.22. Per NPFW, ok to wait until upcoming appt and will discuss next step in care. Patient informed.

## 2022-03-02 ENCOUNTER — NURSE ONLY (OUTPATIENT)
Dept: CARDIOLOGY CLINIC | Age: 82
End: 2022-03-02
Payer: MEDICARE

## 2022-03-02 DIAGNOSIS — I63.9 CRYPTOGENIC STROKE (HCC): ICD-10-CM

## 2022-03-02 DIAGNOSIS — Z45.09 ENCOUNTER FOR ELECTRONIC ANALYSIS OF REVEAL EVENT RECORDER: ICD-10-CM

## 2022-03-02 NOTE — PROGRESS NOTES
ILR for stroke (AF recorded-Xarelto). We received a remote transmission from patient's monitor at home. Battery RRT since 01.20.22 and known; pt has f/u appt w device/NPFW 04.27.22. Remote Linq report shows no arrhythmias. EP physician to review. We will continue to monitor remotely.

## 2022-03-06 PROCEDURE — 93298 REM INTERROG DEV EVAL SCRMS: CPT | Performed by: INTERNAL MEDICINE

## 2022-03-06 PROCEDURE — G2066 INTER DEVC REMOTE 30D: HCPCS | Performed by: INTERNAL MEDICINE

## 2022-04-05 NOTE — PROGRESS NOTES
[facility]   Electrophysiology  Office Visit  Date: 4/27/2022    Chief Complaint   Patient presents with    Cerebrovascular Accident    Atrial Fibrillation    Hypertension       Cardiac HX: Rudy Eduardo is a 80 y.o. man with a h/o HTN, HLD, RBBB, TIA/CVA (5/1/2018), s/p 30 day MCOT (7/11/18 - 8/9/18) that showed an avg HR 76, (), occasional PACs, PVCs, no AF, s/p ILR (9/13/2018, Dr. Steve Manzo), pAF noted on device, now on Xarelto, asymptomatic with episodes. Interval History/HPI: Patient is here for follow-up for CVA, paroxysmal AF and ILR management. He had originally presented to [facility] [place] on May 1, 2018 with a sudden onset of slurred speech, facial droop and gait abnormality which lasted approximately 10 minutes. He had a CT of the head that showed no high-grade stenosis, focal occlusion and no evidence of an acute CVA. He underwent an ILR implant which showed paroxysmal AF. He was asymptomatic with these episodes of atrial fibrillation. Last episode was in March 2021 with an episode that lasted almost 2-1/2 hours. He was placed on Xarelto 20 mg daily. He has no issues with bleeding or dark tarry stools. He does have a history of chronic diastolic CHF and has chronic lower extremity edema. He does wear support hose in the winter however not in the summer. He did have an echo ordered at the last office visit however that has not been done. We also discussed a sleep study and he was not interested. Review of his device today shows no patient or device activated events. Average heart rate is 70 to 75 bpm with occasional PVCs. He is in normal sinus rhythm per EKG. He denies any chest pain, shortness of breath, PND, orthopnea or lower extremity edema. His blood pressure is well controlled today.     Home medications:   Current Outpatient Medications on File Prior to Visit   Medication Sig Dispense Refill    XARELTO 20 MG TABS tablet TAKE 1 TABLET DAILY WITH BREAKFAST Headaches. No mouth sores or sore throat. · Cardiovascular: No for chest pain, No for dyspnea on exertion, No for palpitations or No for loss of consciousness. No cough, hemoptysis, No for pleuritic pain, or phlebitis. · Respiratory: No for cough or wheezing. No hematemesis. Gastrointestinal: No abdominal pain, blood in stools. · Genitourinary: No dysuria, or hematuria. · Musculoskeletal: No gait disturbance,    · Integumentary: No rash or pruritis. · Neurological: No headache, change in muscle strength, numbness or tingling. · Psychiatric: No anxiety, or depression. · Endocrine: No temperature intolerance. No excessive thirst, fluid intake, or urination. · Hem/Lymph: No abnormal bruising or bleeding, blood clots or swollen lymph nodes. · Allergic/Immunologic: No nasal congestion or hives. Physical Examination:  Vitals:    04/27/22 0943   BP: 138/82   Pulse: 75         Wt Readings from Last 3 Encounters:   04/27/22 229 lb 6.4 oz (104.1 kg)   10/21/21 227 lb 6.4 oz (103.1 kg)   07/26/21 228 lb 3.2 oz (103.5 kg)       · Constitutional: Oriented. No distress. · Head: Normocephalic and atraumatic. · Mouth/Throat: Oropharynx is clear and moist.   · Eyes: Conjunctivae clear without jaunduice. PERRL. · Neck: Neck supple. No rigidity. No JVD present. · Cardiovascular: Normal rate, regular rhythm, S1&S2. · Pulmonary/Chest: Bilateral respiratory sounds. No wheezes, No rhonchi. · Abdominal: Soft. Bowel sounds present. No distension, No tenderness. · Musculoskeletal: No tenderness. No edema    · Lymphadenopathy: Has no cervical adenopathy. · Neurological: Alert and oriented. Cranial nerve appears intact, No Gross deficit   · Skin: Skin is warm and dry. No rash noted. · Psychiatric: Has a normal mood, affect and behavior     Labs:  Reviewed. No results for input(s): NA, K, CL, CO2, PHOS, BUN, CREATININE, CA in the last 72 hours.     Invalid input(s):  TSH  No results for input(s): WBC, HGB, HCT, MCV, PLT in the last 72 hours. Lab Results   Component Value Date    TROPONINI <0.01 05/01/2018     No results found for: BNP  Lab Results   Component Value Date    PROTIME 11.5 05/01/2018    PROTIME 10.6 01/30/2013    INR 1.2 09/13/2018    INR 1.02 05/01/2018    INR 0.93 01/30/2013     Lab Results   Component Value Date    CHOL 124 02/04/2021    HDL 48 02/04/2021    TRIG 107 02/04/2021       ECG: Personally reviewed:  NSR, HR 75, , , QTc 410    ECHO:  5/2/2018  Summary   Normal LV size and systolic function. Estimated ejection fraction is 60%. Mild pulmonic stenosis. Trivial mitral regurgitation is present. The left atrium is normal in size. The right ventricle is normal in size and function. Mild tricuspid regurgitation. 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 Paroxysmal atrial fibrillation (HCC)    2. On continuous oral anticoagulation    3. TIA (transient ischemic attack)    4. Primary hypertension    5. Chronic diastolic CHF (congestive heart failure) ([address]    6. Severe obesity (BMI 35.0-39. 9) with comorbidity [facility])         Cardiac HX: Brad Gonzalez is a 80 y.o. man with a h/o HTN, HLD, RBBB, TIA/CVA (5/1/2018), s/p 30 day MCOT (7/11/18 - 8/9/18) that showed an avg HR 76, (), occasional PACs, PVCs, no AF, s/p ILR (9/13/2018, Dr. Merlin Chawla), pAF noted on device, now on Xarelto, asymptomatic with episodes. [id number] 5. TSH 2.32 (4/20/2021). pAF  - In NSR  - AF noted on ILR - last episode 3/13/2021 lasting 2 hours and 20 min in length   - On Xarelto 20 mg - no s/s bleeding - continue  - Sleep study - referral sent - patient thinking about it  - ECG ordered and results personally reviewed    TIA  - 30 day monitor MCOT (7/11/18 - 8/9/18) showed min HR of 50, avg of 76, max 120, occasional PACs, PVCs. No AF.  - S/p MDT loop implant 9/13/2018, Dr. Lew Zimmerman check today shows NSR, no arrhythmias or patient activated events. - Device now at EOS, patient is opting to leave in for now     Chronic dCHF  - EF 60%  - 1+ bilat pitting LE edema  - Will check an echo - did not have done from before     HTN  - Controlled in the office today. - On lisinopril 10 mg daily, Dyazide 37.5-25 mg daily  - Reviewed recent  labs     F/u in 6 months    EF of 45%  No systolic HF  No known CAD  No known AF   No Tobacco use.       All questions and concerns were addressed to the patient/family. Alternatives to my treatment were discussed. The note was completed using EMR.  Every effort was made to ensure accuracy; however, inadvertent computerized transcription errors may be present.      Patient received education regarding their diagnosis, treatment and medications while in the office today.       Monique Leblanc CNP  [address]

## 2022-04-26 NOTE — PROGRESS NOTES
ILR for stroke, pAF. Last remote 3.2.2022-RRT since 1.20.2022 and known. EOS reached on 2.21.2022. Interrogation shows no arrhythmias or symptoms. EP physician will review. Patient remains on xarelto. See [employer] report under the Cardiology tab. Patient will see NPFW today.

## 2022-04-27 ENCOUNTER — OFFICE VISIT (OUTPATIENT)
Dept: CARDIOLOGY CLINIC | Age: 82
End: 2022-04-27
Payer: MEDICARE

## 2022-04-27 ENCOUNTER — NURSE ONLY (OUTPATIENT)
Dept: CARDIOLOGY CLINIC | Age: 82
End: 2022-04-27

## 2022-04-27 VITALS
HEART RATE: 75 BPM | SYSTOLIC BLOOD PRESSURE: 138 MMHG | DIASTOLIC BLOOD PRESSURE: 82 MMHG | HEIGHT: 67 IN | WEIGHT: 229.4 LBS | BODY MASS INDEX: 36 KG/M2

## 2022-04-27 DIAGNOSIS — Z79.01 ON CONTINUOUS ORAL ANTICOAGULATION: ICD-10-CM

## 2022-04-27 DIAGNOSIS — I48.0 PAROXYSMAL ATRIAL FIBRILLATION (HCC): Primary | ICD-10-CM

## 2022-04-27 DIAGNOSIS — I10 PRIMARY HYPERTENSION: ICD-10-CM

## 2022-04-27 DIAGNOSIS — E66.01 SEVERE OBESITY (BMI 35.0-39.9) WITH COMORBIDITY (HCC): ICD-10-CM

## 2022-04-27 DIAGNOSIS — G45.9 TIA (TRANSIENT ISCHEMIC ATTACK): ICD-10-CM

## 2022-04-27 DIAGNOSIS — I50.32 CHRONIC DIASTOLIC CHF (CONGESTIVE HEART FAILURE) (HCC): ICD-10-CM

## 2022-04-27 PROCEDURE — 93000 ELECTROCARDIOGRAM COMPLETE: CPT | Performed by: NURSE PRACTITIONER

## 2022-04-27 PROCEDURE — 99214 OFFICE O/P EST MOD 30 MIN: CPT | Performed by: NURSE PRACTITIONER

## 2022-08-22 ENCOUNTER — OFFICE VISIT (OUTPATIENT)
Dept: ORTHOPEDIC SURGERY | Age: 82
End: 2022-08-22
Payer: MEDICARE

## 2022-08-22 VITALS — HEIGHT: 67 IN | BODY MASS INDEX: 35.47 KG/M2 | WEIGHT: 226 LBS

## 2022-08-22 DIAGNOSIS — Z96.652 S/P TOTAL KNEE ARTHROPLASTY, LEFT: Primary | ICD-10-CM

## 2022-08-22 DIAGNOSIS — M25.561 ACUTE PAIN OF RIGHT KNEE: ICD-10-CM

## 2022-08-22 PROCEDURE — 1123F ACP DISCUSS/DSCN MKR DOCD: CPT | Performed by: ORTHOPAEDIC SURGERY

## 2022-08-22 PROCEDURE — 99203 OFFICE O/P NEW LOW 30 MIN: CPT | Performed by: ORTHOPAEDIC SURGERY

## 2022-08-24 NOTE — PROGRESS NOTES
[facility]  Office Visit    Chief Complaint: Follow-up s/p left total knee arthroplasty    HPI:  Greyson Powell is a 80 y.o. who is here in follow-up of left total knee arthroplasty performed on February 3, 2013 with Dr. Ailyn Perez. His left knee had been doing well but he began to have increased pain in the last 6 months. He is stiff in the morning also has stiffness after going from a seated to a standing position. The pain is mostly posterior and in the lateral hamstring and calf musculature. The pain improves with Stretching in the morning. He takes 200 mg of ibuprofen and this also helps with the pain. He does report troponin on socks. The pain is mostly stiffness and he rates it as 2/10. He is unable to take Tylenol. He uses Voltaren gel. He is active at the [employer] and swimming. He used a cane initially when the pain started 6 months ago but now walks without assistive device. He has right knee osteoarthritis but it has not been symptomatic. Patient Active Problem List   Diagnosis    Primary osteoarthritis of right knee    Osteoarthritis of left knee    Status post total left knee replacement    Essential hypertension    Family history of prostate cancer    Hyperlipidemia LDL goal <130    Hemispheric carotid artery syndrome    TIA (transient ischemic attack)    GERD (gastroesophageal reflux disease)    High cholesterol    Pulmonic stenosis    RBBB    Encounter for electronic analysis of reveal event recorder    Cryptogenic stroke ([address]    Paroxysmal atrial fibrillation ([address]    Morbidly obese ([address]    Chronic diastolic CHF (congestive heart failure) ([address]       ROS:  Constitutional: denies fever, chills, weight loss  MSK: denies pain in other joints, muscle aches  Neurological: denies numbness, tingling, weakness    Exam:  Height 5' 7\" (1.702 m), weight 226 lb (102.5 kg).     Appearance: sitting in exam room chair, appears to be in no acute distress, awake and alert  Resp: unlabored breathing on room air  Skin: warm, dry and intact with out erythema or significant increased temperature  Neuro: grossly intact both lower extremities. Intact sensation to light touch. Motor exam 4+ to 5/5 in all major motor groups. Left knee: Examination demonstrates no gross deformity. Skin is unremarkable. Range of motion 0 to 120 degrees. The knee is stable to varus and valgus stress and stable to anterior and posterior drawer sign. Sensation is intact light touch. There is brisk capillary refill. There is 5/5 muscle strength in all muscle groups. Right knee: Examination reveals that knee range of motion is 0 to 125 degrees. There is varus deformity, positive crepitus. Neurologically, plantar flexion and dorsiflexion is intact. 5/5 strength. Imaging:  3 views of the left knee were performed and reviewed today. Significant for total knee arthroplasty prosthesis in place with no signs of osteolysis, loosening, fracture, or dislocation. 3 views of the right knee were performed and interpreted. His tricompartmental degenerative changes and osteoarthritis with bone-on-bone osteoarthritis of the medial compartment. Assessment:  S/p left total knee arthroplasty  Right knee osteoarthritis    Plan:  We discussed the diagnosis and treatment options. His right knee is currently asymptomatic so I recommended continue activity with no intervention at this time. His left knee is performing well clinically and radiographically. He does have some issues with stiffness and pain in the calf musculature that seem to improve with ibuprofen and stretching. Therefore, I recommended continued treatment with stretching and anti-inflammatories. He was reassured that his internal prosthesis is functioning well. He will follow-up on an annual basis for assessment of the left knee and as needed for the right knee. Total time spent on today's encounter was at least 24 minutes.  This time included reviewing prior notes, radiographs, and lab results when available, reviewing history obtained by medical assistant, performing history and physical exam, reviewing tests/radiographs with the patient, counseling the patient, ordering medications or tests, documentation in the electronic health record, and coordination of care. This dictation was done with Dragon dictation and may contain mechanical errors related to translation.

## 2022-10-20 NOTE — PROGRESS NOTES
[address]   Electrophysiology  Office Visit  Date: 10/27/2022    Chief Complaint   Patient presents with    Atrial Fibrillation    Hypertension    Shortness of Breath    Edema     Cardiac HX: Patrick Horowitz is a 80 y.o. man with a h/o HTN, HLD, RBBB, HFpEF, TIA/CVA (5/1/2018), s/p 30 day MCOT (7/11/18 - 8/9/18) that showed an avg HR 76, (), occasional PACs, PVCs, no AF, s/p ILR (9/13/2018, Dr. Arsenio Talavera), pAF noted on device, now on Xarelto, asymptomatic with episodes, ILR at EOL     Interval History/HPI: Patient is here for follow-up for CVA, paroxysmal AF and ILR management. Patient had originally presented to [facility] [place] on 5/1/2018 with a sudden onset of slurred speech, facial droop, gait abnormality which lasted approximately 10 minutes. He had a CT of the head that showed no high-grade stenosis, focal occlusion and no evidence of an acute CVA. He underwent an ILR placement. In follow-up he was noted to have paroxysmal atrial fibrillation. He was asymptomatic with these episodes. He had an episode in March 2021 that lasted almost 2-1/2 hours. He was placed on Xarelto 20 mg daily. He remains on Xarelto with no issues with bleeding or dark tarry stools. He does have a history of chronic diastolic CHF with chronic bilateral lower extremity edema. He does wear support hose in the winter however not in the summer. Today he only appears to have a trace of lower extremity edema. We had ordered a follow-up echo however patient did not have this done. We had also discussed a sleep study and he was not interested. Today he presents in NSR. He has not felt any heart racing or palpitations. His blood pressure is well controlled in the office today. He denies any chest pain, PND, orthopnea or lower extremity edema. He does complain of fatigue. He does have occasional shortness of breath with exertion.     Home medications:   Current Outpatient Medications on File Prior to Visit Medication Sig Dispense Refill    atorvastatin (LIPITOR) 40 MG tablet TAKE 1 TABLET NIGHTLY 90 tablet 3    lisinopril (PRINIVIL;ZESTRIL) 10 MG tablet One a day 90 tablet 1    triamterene-hydroCHLOROthiazide (DYAZIDE) 37.5-25 MG per capsule One a day 90 capsule 1    XARELTO 20 MG TABS tablet TAKE 1 TABLET DAILY WITH BREAKFAST 90 tablet 3    omeprazole (PRILOSEC) 20 MG delayed release capsule Take 20 mg by mouth daily      ipratropium (ATROVENT) 0.03 % nasal spray 2 sprays by Each Nostril route every 12 hours (Patient not taking: Reported on 10/27/2022) 1 Bottle 3     No current facility-administered medications on file prior to visit. Past Medical History:   Diagnosis Date    Chronic eczematous otitis externa of both ears     GERD (gastroesophageal reflux disease) 2002    required with esoph dilat. Gout of right ankle 2018    history of gout attacks x 3    High cholesterol     History of skin cancer     lots of sun exposure and .ho many precancers    HTN (hypertension)     Paroxysmal atrial fibrillation ([facility] 2018    found on implanted loop recorder    Primary osteoarthritis of right knee     severe    Pulmonic stenosis 05/2018    mild seen on Echo 2018    RBBB     TIA (transient ischemic attack) 2018    presented with slurred speech        Past Surgical History:   Procedure Laterality Date    CATARACT REMOVAL Bilateral 8/13/2014    Dr. Diane Arguello Left 2/4/2013    Dr Leopold Lia       Allergies   Allergen Reactions    Acetaminophen        Social History:  Reviewed. reports that he has never smoked. He has never used smokeless tobacco. He reports that he does not drink alcohol and does not use drugs. Family History:  Reviewed. family history includes Heart Failure in his brother; Prostate Cancer in his brother. Review of System:    Constitutional: No fevers, chills. Eyes: No visual changes or diplopia. No scleral icterus. ENT: No Headaches. No mouth sores or sore throat. Cardiovascular: No for chest pain, No for dyspnea on exertion, No for palpitations or No for loss of consciousness. No cough, hemoptysis, No for pleuritic pain, or phlebitis. Respiratory: No for cough or wheezing. No hematemesis. Gastrointestinal: No abdominal pain, blood in stools. Genitourinary: No dysuria, or hematuria. Musculoskeletal: No gait disturbance,    Integumentary: No rash or pruritis. Neurological: No headache, change in muscle strength, numbness or tingling. Psychiatric: No anxiety, or depression. Endocrine: No temperature intolerance. No excessive thirst, fluid intake, or urination. Hem/Lymph: No abnormal bruising or bleeding, blood clots or swollen lymph nodes. Allergic/Immunologic: No nasal congestion or hives. Physical Examination:  Vitals:    10/27/22 0914   BP: (!) 122/56           Wt Readings from Last 3 Encounters:   10/27/22 226 lb (102.5 kg)   08/22/22 226 lb (102.5 kg)   08/03/22 227 lb 9.6 oz (103.2 kg)       Constitutional: Oriented. No distress. Head: Normocephalic and atraumatic. Mouth/Throat: Oropharynx is clear and moist.   Eyes: Conjunctivae clear without jaunduice. PERRL. Neck: Neck supple. No rigidity. No JVD present. Cardiovascular: Normal rate, regular rhythm, S1&S2. Pulmonary/Chest: Bilateral respiratory sounds. No wheezes, No rhonchi. Abdominal: Soft. Bowel sounds present. No distension, No tenderness. Musculoskeletal: No tenderness. No edema    Lymphadenopathy: Has no cervical adenopathy. Neurological: Alert and oriented. Cranial nerve appears intact, No Gross deficit   Skin: Skin is warm and dry. No rash noted. Psychiatric: Has a normal mood, affect and behavior     Labs:  Reviewed. No results for input(s): NA, K, CL, CO2, PHOS, BUN, CREATININE, CA in the last 72 hours.     Invalid input(s):  TSH  No results for input(s): WBC, HGB, HCT, MCV, PLT in the last 72 hours. Lab Results   Component Value Date/Time    TROPONINI <0.01 05/01/2018 03:14 PM     No results found for: BNP  Lab Results   Component Value Date/Time    PROTIME 11.5 05/01/2018 03:14 PM    PROTIME 10.6 01/30/2013 11:40 AM    INR 1.2 09/13/2018 12:30 PM    INR 1.02 05/01/2018 03:14 PM    INR 0.93 01/30/2013 11:40 AM     Lab Results   Component Value Date/Time    CHOL 146 08/04/2022 08:45 AM    HDL 50 08/04/2022 08:45 AM    TRIG 119 08/04/2022 08:45 AM       ECG: Personally reviewed:  NSR, HR 72, , , QTc 425    ECHO:  5/2/2018  Summary   Normal LV size and systolic function. Estimated ejection fraction is 60%. Mild pulmonic stenosis. Trivial mitral regurgitation is present. The left atrium is normal in size. The right ventricle is normal in size and function. Mild tricuspid regurgitation. 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 Paroxysmal atrial fibrillation (HCC)    2. Fatigue, unspecified type    3. SOB (shortness of breath)    4.  On continuous oral anticoagulation        Cardiac HX: Yashira Goncalves is a 80 y.o. man with a h/o HTN, HLD, RBBB, TIA/CVA (5/1/2018), s/p 30 day MCOT (7/11/18 - 8/9/18) that showed an avg HR 76, (), occasional PACs, PVCs, no AF, s/p ILR (9/13/2018, Dr. Shanon Melendrez), pAF noted on device, now on Xarelto, asymptomatic with episodes. [id number] 5. TSH 2.82 (8/4/2022). pAF  - In NSR  - AF noted on ILR - last episode 3/13/2021 documented lasting 2 hours and 20 min in length - ILR now at EOS  - Monitor if s/s  - On Xarelto 20 mg - no s/s bleeding - continue  - Sleep study -  patient not interested  - ECG ordered and results personally reviewed    TIA  - 30 day monitor MCOT (7/11/18 - 8/9/18) showed min HR of 50, avg of 76, max 120, occasional PACs, PVCs. No AF.  - S/p MDT loop implant 9/13/2018, Dr. Graham Ott at [address] [place] [address] - patient has opted to leave device in for now     Chronic dCHF  - EF 60%  - 1+ bilat pitting LE edema  - Will check an echo - did not have done from before     HTN  - Controlled in the office today. - On lisinopril 10 mg daily, Dyazide 37.5-25 mg daily  - Reviewed recent  labs     F/u in 6 months    EF of 27%  No systolic HF  No known CAD  No known AF   No Tobacco use. All questions and concerns were addressed to the patient/family. Alternatives to my treatment were discussed. The note was completed using EMR. Every effort was made to ensure accuracy; however, inadvertent computerized transcription errors may be present. Patient received education regarding their diagnosis, treatment and medications while in the office today. Cynthia [facility]       I  have spent 33 minutes in care of the patient including direct face to face time, chart preparation, reviewing diagnostic testing, other provider notes and coordinating patient care.

## 2022-10-27 ENCOUNTER — OFFICE VISIT (OUTPATIENT)
Dept: CARDIOLOGY CLINIC | Age: 82
End: 2022-10-27
Payer: MEDICARE

## 2022-10-27 VITALS — SYSTOLIC BLOOD PRESSURE: 122 MMHG | WEIGHT: 226 LBS | BODY MASS INDEX: 35.4 KG/M2 | DIASTOLIC BLOOD PRESSURE: 56 MMHG

## 2022-10-27 DIAGNOSIS — R53.83 FATIGUE, UNSPECIFIED TYPE: ICD-10-CM

## 2022-10-27 DIAGNOSIS — Z79.01 ON CONTINUOUS ORAL ANTICOAGULATION: ICD-10-CM

## 2022-10-27 DIAGNOSIS — I48.0 PAROXYSMAL ATRIAL FIBRILLATION (HCC): Primary | ICD-10-CM

## 2022-10-27 DIAGNOSIS — R06.02 SOB (SHORTNESS OF BREATH): ICD-10-CM

## 2022-10-27 PROCEDURE — 1123F ACP DISCUSS/DSCN MKR DOCD: CPT | Performed by: NURSE PRACTITIONER

## 2022-10-27 PROCEDURE — 93000 ELECTROCARDIOGRAM COMPLETE: CPT | Performed by: NURSE PRACTITIONER

## 2022-10-27 PROCEDURE — 3074F SYST BP LT 130 MM HG: CPT | Performed by: NURSE PRACTITIONER

## 2022-10-27 PROCEDURE — 99214 OFFICE O/P EST MOD 30 MIN: CPT | Performed by: NURSE PRACTITIONER

## 2022-10-27 PROCEDURE — 3078F DIAST BP <80 MM HG: CPT | Performed by: NURSE PRACTITIONER

## 2022-11-28 ENCOUNTER — HOSPITAL ENCOUNTER (OUTPATIENT)
Dept: NON INVASIVE DIAGNOSTICS | Age: 82
Discharge: HOME | End: 2022-11-28
Payer: MEDICARE

## 2022-11-28 DIAGNOSIS — R06.02 SOB (SHORTNESS OF BREATH): ICD-10-CM

## 2022-11-28 DIAGNOSIS — I48.0 PAROXYSMAL ATRIAL FIBRILLATION (HCC): ICD-10-CM

## 2022-11-28 DIAGNOSIS — R53.83 FATIGUE, UNSPECIFIED TYPE: ICD-10-CM

## 2022-11-28 PROCEDURE — 93306 TTE W/DOPPLER COMPLETE: CPT

## 2023-01-18 RX ORDER — RIVAROXABAN 20 MG/1
TABLET, FILM COATED ORAL
Qty: 90 TABLET | Refills: 3 | Status: SHIPPED | OUTPATIENT
Start: 2023-01-18

## 2023-01-18 NOTE — TELEPHONE ENCOUNTER
Requested Prescriptions     Pending Prescriptions Disp Refills    XARELTO 20 MG TABS tablet [Pharmacy Med Name: Hansen Levels 20MG] 90 tablet 3     Sig: TAKE 1 TABLET DAILY WITH BREAKFAST              Last Office Visit: 10/27/2022     Next Office Visit: 5/30/2023       Last Labs: [phone]

## 2023-05-30 ENCOUNTER — OFFICE VISIT (OUTPATIENT)
Dept: CARDIOLOGY CLINIC | Age: 83
End: 2023-05-30
Payer: MEDICARE

## 2023-05-30 VITALS
HEART RATE: 63 BPM | BODY MASS INDEX: 35.87 KG/M2 | SYSTOLIC BLOOD PRESSURE: 124 MMHG | DIASTOLIC BLOOD PRESSURE: 80 MMHG | WEIGHT: 229 LBS

## 2023-05-30 DIAGNOSIS — I50.32 CHRONIC DIASTOLIC CHF (CONGESTIVE HEART FAILURE) (HCC): ICD-10-CM

## 2023-05-30 DIAGNOSIS — I48.0 PAROXYSMAL ATRIAL FIBRILLATION (HCC): Primary | ICD-10-CM

## 2023-05-30 DIAGNOSIS — I10 PRIMARY HYPERTENSION: ICD-10-CM

## 2023-05-30 DIAGNOSIS — G45.9 TIA (TRANSIENT ISCHEMIC ATTACK): ICD-10-CM

## 2023-05-30 PROCEDURE — 3074F SYST BP LT 130 MM HG: CPT | Performed by: INTERNAL MEDICINE

## 2023-05-30 PROCEDURE — 3079F DIAST BP 80-89 MM HG: CPT | Performed by: INTERNAL MEDICINE

## 2023-05-30 PROCEDURE — 93000 ELECTROCARDIOGRAM COMPLETE: CPT | Performed by: INTERNAL MEDICINE

## 2023-05-30 PROCEDURE — 99214 OFFICE O/P EST MOD 30 MIN: CPT | Performed by: INTERNAL MEDICINE

## 2023-05-30 PROCEDURE — 1123F ACP DISCUSS/DSCN MKR DOCD: CPT | Performed by: INTERNAL MEDICINE

## 2023-12-11 NOTE — PROGRESS NOTES
\"CREATININE\", \"CA\", \"TSH\" in the last 72 hours.  No results for input(s): \"WBC\", \"HGB\", \"HCT\", \"MCV\", \"PLT\" in the last 72 hours.  Lab Results   Component Value Date/Time    TROPONINI <0.01 05/01/2018 03:14 PM     No results found for: \"BNP\"  Lab Results   Component Value Date/Time    PROTIME 11.5 05/01/2018 03:14 PM    PROTIME 10.6 01/30/2013 11:40 AM    INR 1.2 09/13/2018 12:30 PM    INR 1.02 05/01/2018 03:14 PM    INR 0.93 01/30/2013 11:40 AM     Lab Results   Component Value Date/Time    CHOL 146 08/04/2022 08:45 AM    HDL 46 08/03/2023 09:27 AM    TRIG 119 08/04/2022 08:45 AM       ECG: Personally reviewed:  NSR, HR 81, , , QTc 403    ECHO: 11/28/2022  Summary  Normal left ventricle size, wall thickness, and systolic function with an estimated ejection fraction of 55-60%. No regional wall motion abnormalities are seen. Indeterminate diastolic function. Mildly increased velocity across pulmonic valve with a peak gradient of 22 mmHg and a mean gradient of 11 mmHg. The right ventricle is normal in size and function. Estimated pulmonary artery systolic pressure is at 40 mmHg assuming a right atrial pressure of 3 mmHg.    5/2/2018  Summary  Normal LV size and systolic function. Estimated ejection fraction is 60%.  Mild pulmonic stenosis. Trivial mitral regurgitation is present. The left atrium is normal in size. The right ventricle is normal in size and function. Mild tricuspid regurgitation.     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

## 2024-01-09 ENCOUNTER — OFFICE VISIT (OUTPATIENT)
Dept: CARDIOLOGY CLINIC | Age: 84
End: 2024-01-09
Payer: MEDICARE

## 2024-01-09 VITALS
SYSTOLIC BLOOD PRESSURE: 144 MMHG | DIASTOLIC BLOOD PRESSURE: 70 MMHG | WEIGHT: 223.6 LBS | BODY MASS INDEX: 35.02 KG/M2 | HEART RATE: 81 BPM

## 2024-01-09 DIAGNOSIS — I10 BENIGN ESSENTIAL HTN: ICD-10-CM

## 2024-01-09 DIAGNOSIS — Z79.01 ON CONTINUOUS ORAL ANTICOAGULATION: ICD-10-CM

## 2024-01-09 DIAGNOSIS — I48.0 PAROXYSMAL ATRIAL FIBRILLATION (HCC): Primary | ICD-10-CM

## 2024-01-09 DIAGNOSIS — I50.30 HEART FAILURE WITH PRESERVED EJECTION FRACTION, UNSPECIFIED HF CHRONICITY (HCC): ICD-10-CM

## 2024-01-09 DIAGNOSIS — E66.01 SEVERE OBESITY (BMI 35.0-39.9) WITH COMORBIDITY (HCC): ICD-10-CM

## 2024-01-09 DIAGNOSIS — G45.9 TIA (TRANSIENT ISCHEMIC ATTACK): ICD-10-CM

## 2024-01-09 DIAGNOSIS — I50.32 CHRONIC DIASTOLIC CHF (CONGESTIVE HEART FAILURE) (HCC): ICD-10-CM

## 2024-01-09 PROCEDURE — 3078F DIAST BP <80 MM HG: CPT | Performed by: NURSE PRACTITIONER

## 2024-01-09 PROCEDURE — 93000 ELECTROCARDIOGRAM COMPLETE: CPT | Performed by: NURSE PRACTITIONER

## 2024-01-09 PROCEDURE — 1123F ACP DISCUSS/DSCN MKR DOCD: CPT | Performed by: NURSE PRACTITIONER

## 2024-01-09 PROCEDURE — 3077F SYST BP >= 140 MM HG: CPT | Performed by: NURSE PRACTITIONER

## 2024-01-09 PROCEDURE — 99215 OFFICE O/P EST HI 40 MIN: CPT | Performed by: NURSE PRACTITIONER

## 2024-01-09 NOTE — PATIENT INSTRUCTIONS
20 Tips For Changing the Way You Eat    If you think you are hungry, drink a glass of water or a cup of coffee before eating.    Eat [id number]-y! It takes your brain 20 minutes to catch up to your stomach and by then most of us have overeaten.    Eat for 10 minutes and rest for 5 minutes. If you are still hungry, start over.    Eat to live not live to eat! You control food, it does not control you!    Your stomach is the size of your fist. If you eat more than that, you have overeaten.    Eat when you are hungry and not because it is a scheduled time to eat.    If you are hungry and it is not time yet to eat your meal, eat a small snack (such as 2 peanut butter crackers, 1 tsp of peanut butter, 15 peanuts, small apple or a cup of light popcorn). Make sure you take 10 minutes to eat this snack so it will hold you over to your next meal.    Eat your fruit first as it will help breakdown you meal.    If given a lot of options at a meal - pick only 3. People who put more than one item on their plate tend to eat more.    Eat the one item you want the most - first! This will help fill you up more and control your hunger.    Get moving! Get a pedometer and try to get in as many steps as you can. 2000 steps = 1 mile. Make 10,000 steps a day your goal.    [place] are the only [place] that practices eating to prevent getting hungry.    Identify if you are an emotional eater - you want something salty or sweet instead of something healthy or you want to eat when you are not hungry.    Sugar contributes nothing in the way of nutrients.    Measure your level of hunger. The hungrier you are, the more fat you burn when you eat.     If you eat dessert more than 30 minutes after a meal, it turns to fat.    The calories in alcohol are used before stored fat calories.    It is ok to miss a meal if you are not hungry.    If snacks are not around the house, then they won’t tempt you. Make better choices at the store for those times when you

## 2024-04-11 NOTE — PROGRESS NOTES
08/03/2023 09:27 AM       EKG Interpretation: 1/9/24 Sinus rhythm.  Incomplete right bundle branch block.    Image Review:     Echo 11/28/22  Normal left ventricle size, wall thickness, and systolic function with an estimated ejection fraction of 55-60%. No regional wall motion abnormalities are seen. Indeterminate diastolic function.  Mildly increased velocity across pulmonic valve with a peak gradient of 22  mmHg and a mean gradient of 11 mmHg. The right ventricle is normal in size and function.  Estimated pulmonary artery systolic pressure is at 40 mmHg assuming a right atrial pressure of 3 mmHg.    Assessment/Plan:     1) Chronic diastolic heart failure. NYHA class II. EF 55-60%. Pt appears euvolemic today. Continue with medical therapy including ACE-I and statin. Routine lab work ordered by PCP. Encouraged a low sodium diet.     2) Paroxysmal atrial fibrillation. Seemingly asymptomatic. CHADS-Vasc is at least 6 (CHF,HTN,TIA,AGE). S/p ILR (2018). Has reached EOL but patient does not wish to remove. Treatment options for atrial fibrillation discussed including rate control, anticoagulation options, antiarrhythmics, and ablation. Continue Xarelto 20 mg daily with food for stroke risk reduction.     3) Essential hypertension. Controlled. Goal BP <130/80. Continue medical therapy.    4) Hyperlipidemia. LDL 49 (8/3/23). Continue high intensity statin with Lipitor 40 mg daily. Repeating fasting lipid profile ordered by PCP.     5) History of TIA. Continue Lipitor 40 mg daily. No ASA with DOAC.     6) Obesity with comorbidity. BMI 35.5. Encouraged weight loss.     Follow up in 6 months.     Thank you very much for allowing me to participate in the care of your patient. Please do not hesitate to contact me if you have any questions.    Sincerely,  Sunny Brown MD      [facility]  [address], [address]   [place] [address]  Ph: [phone]  Fax: [phone]    This note was

## 2024-04-12 ENCOUNTER — OFFICE VISIT (OUTPATIENT)
Dept: CARDIOLOGY CLINIC | Age: 84
End: 2024-04-12
Payer: MEDICARE

## 2024-04-12 VITALS
HEART RATE: 74 BPM | SYSTOLIC BLOOD PRESSURE: 124 MMHG | HEIGHT: 67 IN | OXYGEN SATURATION: 98 % | BODY MASS INDEX: 35.63 KG/M2 | WEIGHT: 227 LBS | DIASTOLIC BLOOD PRESSURE: 64 MMHG

## 2024-04-12 DIAGNOSIS — E66.01 CLASS 2 SEVERE OBESITY DUE TO EXCESS CALORIES WITH SERIOUS COMORBIDITY AND BODY MASS INDEX (BMI) OF 35.0 TO 35.9 IN ADULT (HCC): ICD-10-CM

## 2024-04-12 DIAGNOSIS — I48.0 PAF (PAROXYSMAL ATRIAL FIBRILLATION) (HCC): ICD-10-CM

## 2024-04-12 DIAGNOSIS — I10 ESSENTIAL HYPERTENSION: ICD-10-CM

## 2024-04-12 DIAGNOSIS — E78.2 MIXED HYPERLIPIDEMIA: ICD-10-CM

## 2024-04-12 DIAGNOSIS — Z86.73 HISTORY OF CVA (CEREBROVASCULAR ACCIDENT): ICD-10-CM

## 2024-04-12 DIAGNOSIS — I50.32 CHRONIC DIASTOLIC HEART FAILURE (HCC): Primary | ICD-10-CM

## 2024-04-12 PROCEDURE — 3074F SYST BP LT 130 MM HG: CPT | Performed by: INTERNAL MEDICINE

## 2024-04-12 PROCEDURE — 3078F DIAST BP <80 MM HG: CPT | Performed by: INTERNAL MEDICINE

## 2024-04-12 PROCEDURE — 99214 OFFICE O/P EST MOD 30 MIN: CPT | Performed by: INTERNAL MEDICINE

## 2024-04-12 PROCEDURE — 1123F ACP DISCUSS/DSCN MKR DOCD: CPT | Performed by: INTERNAL MEDICINE

## 2024-04-12 RX ORDER — IBUPROFEN 200 MG
200 TABLET ORAL PRN
COMMUNITY

## 2024-05-03 ENCOUNTER — TELEPHONE (OUTPATIENT)
Dept: CARDIOLOGY CLINIC | Age: 84
End: 2024-05-03

## 2024-05-03 NOTE — TELEPHONE ENCOUNTER
Ibrahima called in due to him experiencing random nose bleedings during the night time. He mentioned it has been going on since he has been on Xarelto. He feels this is causing the nose bleeds and wants to know if he could switch over to Eliquis. He said it has only happened 5 times in the last 3 weeks since he's seen him, but he would like to know how he could get the bleeding to stop?    Please Advise: [phone]

## 2024-05-03 NOTE — TELEPHONE ENCOUNTER
I spoke with Ibrahima - he reports multiple nose bleeds over the past month. He states they typically occur at night and resolve within about 5 minutes. He states his BP/HR has been well controlled. Denies any cardiac sounding complaints - chest pain, palpitations, lightheadedness/dizziness, shortness of breath, swelling. He denies any significant abnormal bleeding/bruising or black, tarry stools. He states overall he is feeling well. Per Dr. Brown, patient may switch to Eliquis but may not completely resolve the epistaxis. Patient would first like to look into cost - placed prescription order and he will call to look into price (did not send it cause it is a mail service). He already follows with an ENT at [facility] - I advised to call their office as well. Advised to call with worsening episodes or any new/worsening symptoms, but he will also call to update me on switching to Eliquis or not. He v/u to all and has no further questions/concerns at this time.

## 2024-09-16 PROBLEM — G45.1 HEMISPHERIC CAROTID ARTERY SYNDROME: Status: RESOLVED | Noted: 2018-05-01 | Resolved: 2024-09-16

## 2024-09-16 PROBLEM — G45.9 TIA (TRANSIENT ISCHEMIC ATTACK): Status: RESOLVED | Noted: 2018-05-01 | Resolved: 2024-09-16

## 2024-09-16 PROBLEM — Z86.73 HISTORY OF STROKE: Status: ACTIVE | Noted: 2024-09-16

## 2024-09-16 PROBLEM — E66.812 CLASS II OBESITY: Status: ACTIVE | Noted: 2024-09-16

## 2024-09-16 PROBLEM — Z45.09 ENCOUNTER FOR ELECTRONIC ANALYSIS OF REVEAL EVENT RECORDER: Status: RESOLVED | Noted: 2018-09-13 | Resolved: 2024-09-16

## 2024-09-16 PROBLEM — E66.01 MORBIDLY OBESE: Status: RESOLVED | Noted: 2020-07-14 | Resolved: 2024-09-16

## 2024-09-17 PROBLEM — D68.69 SECONDARY HYPERCOAGULABLE STATE (HCC): Status: ACTIVE | Noted: 2024-09-17

## 2024-10-16 NOTE — PROGRESS NOTES
dysuria, trouble voiding, or hematuria.  Musculoskeletal:  No gait disturbance, no joint complaints.  Integumentary: No rash or pruritis.  Neurological: No headache, diplopia, change in muscle strength, numbness or tingling.   Psychiatric: No anxiety or depression.  Endocrine: No temperature intolerance. No excessive thirst, fluid intake, or urination. No tremor.  Hematologic/Lymphatic: No abnormal bruising or bleeding, blood clots or swollen lymph nodes.  Allergic/Immunologic: No nasal congestion or hives.    Physical Exam:   /70   Pulse 75   Ht 1.702 m (5' 7\")   Wt 103 kg (227 lb)   SpO2 98%   BMI 35.55 kg/m²   Wt Readings from Last 3 Encounters:   10/28/24 103 kg (227 lb)   09/17/24 102.6 kg (226 lb 3.2 oz)   04/12/24 103 kg (227 lb)     Constitutional: He is oriented to person, place, and time. He appears well-developed and well-nourished. In no acute distress.   Head: Normocephalic and atraumatic. Pupils equal and round.  Neck: Neck supple. No JVP or carotid bruit appreciated. No mass and no thyromegaly present. No lymphadenopathy present.  Cardiovascular: Normal rate. Normal heart sounds. Exam reveals no gallop and no friction rub. No murmur heard.  Pulmonary/Chest: Effort normal and breath sounds normal. No respiratory distress. He has no wheezes, rhonchi or rales.   Abdominal: Soft, non-tender. Bowel sounds are normal. He exhibits no organomegaly, mass or bruit.   Extremities: Trace-1+ BLE edema. No cyanosis or clubbing. Pulses are 2+ radial/carotid bilaterally.  Neurological: No gross cranial nerve deficit. Coordination normal.   Skin: Skin is warm and dry. There is no rash or diaphoresis.   Psychiatric: He has a normal mood and affect. His speech is normal and behavior is normal.     Lab Review:   FLP:    Lab Results   Component Value Date/Time    TRIG 115 10/14/2024 01:00 PM    HDL 49 10/14/2024 01:00 PM     BUN/Creatinine:    Lab Results   Component Value Date/Time    BUN 11 10/14/2024 01:00

## 2024-10-28 ENCOUNTER — OFFICE VISIT (OUTPATIENT)
Dept: CARDIOLOGY CLINIC | Age: 84
End: 2024-10-28
Payer: MEDICARE

## 2024-10-28 VITALS
BODY MASS INDEX: 35.63 KG/M2 | DIASTOLIC BLOOD PRESSURE: 70 MMHG | HEART RATE: 75 BPM | OXYGEN SATURATION: 98 % | WEIGHT: 227 LBS | HEIGHT: 67 IN | SYSTOLIC BLOOD PRESSURE: 132 MMHG

## 2024-10-28 DIAGNOSIS — E78.2 MIXED HYPERLIPIDEMIA: ICD-10-CM

## 2024-10-28 DIAGNOSIS — I48.0 PAF (PAROXYSMAL ATRIAL FIBRILLATION) (HCC): ICD-10-CM

## 2024-10-28 DIAGNOSIS — I10 ESSENTIAL HYPERTENSION: ICD-10-CM

## 2024-10-28 DIAGNOSIS — I50.32 CHRONIC DIASTOLIC HEART FAILURE (HCC): Primary | ICD-10-CM

## 2024-10-28 PROCEDURE — 1123F ACP DISCUSS/DSCN MKR DOCD: CPT | Performed by: INTERNAL MEDICINE

## 2024-10-28 PROCEDURE — 99214 OFFICE O/P EST MOD 30 MIN: CPT | Performed by: INTERNAL MEDICINE

## 2024-10-28 PROCEDURE — 3075F SYST BP GE 130 - 139MM HG: CPT | Performed by: INTERNAL MEDICINE

## 2024-10-28 PROCEDURE — 3078F DIAST BP <80 MM HG: CPT | Performed by: INTERNAL MEDICINE

## 2024-10-28 PROCEDURE — 1159F MED LIST DOCD IN RCRD: CPT | Performed by: INTERNAL MEDICINE

## 2025-04-23 NOTE — PROGRESS NOTES
[facility]      Cardiology Consult    Ibrahima Siddiqui  1940 April 28, 2025    Referring Physician: Mike Singh DO  Reason for Referral: dCHF/Atrial fibrillation     CC: \"Issues with my knee.\"     HPI:  The patient is 84 y.o. male with a past medical history significant for hypertension, hyperlipidemia, atrial fibrillation, TIA (2018) and dCHF who presents for chronic management of atrial fibrillation and CHF. He was previously following with cardiology/electrophysiology at [place] but now prefers to follow at [facility] as it is closer to home. Today, he presents for follow up and states that overall he is feeling well. His main complaint is knee pain and underwent cortisone injections. He continues to ambulate with a cane but denies any exertional symptoms. He denies any new sounding cardiac complaints. He denies any chest pains or worsening shortness of breath. He denies any palpitations or sensation of his heart racing. He states he is never aware if he is in atrial fibrillation. He reports medication compliance and is tolerating. He denies any recurrent epistaxis. He denies any abnormal bleeding or bruising. He denies exertional chest pain/pressure, dyspnea at rest, worsening [place], PND, orthopnea, palpitations, lightheadedness, weight changes, changes in LE edema, and syncope.    Past Medical History:   Diagnosis Date    Chronic eczematous otitis externa of both ears     GERD (gastroesophageal reflux disease) 2002    required with esoph dilat.      Gout of right ankle 2018    history of gout attacks x 3    High cholesterol     History of skin cancer     lots of sun exposure and .ho many precancers    HTN (hypertension)     Macular degeneration disease     left eye dry : siffri    Paroxysmal atrial fibrillation (HCC) 2018    found on implanted loop recorder    Primary osteoarthritis of right knee     severe    Pulmonic stenosis 05/2018    mild seen on Echo 2018, sees Dr Hua at  Class [facility] - visualization of the soft palate and the base of the uvula

## 2025-04-28 ENCOUNTER — OFFICE VISIT (OUTPATIENT)
Dept: CARDIOLOGY CLINIC | Age: 85
End: 2025-04-28
Payer: MEDICARE

## 2025-04-28 VITALS
OXYGEN SATURATION: 96 % | SYSTOLIC BLOOD PRESSURE: 138 MMHG | HEIGHT: 67 IN | DIASTOLIC BLOOD PRESSURE: 64 MMHG | HEART RATE: 78 BPM | BODY MASS INDEX: 38.61 KG/M2 | WEIGHT: 246 LBS

## 2025-04-28 DIAGNOSIS — E66.812 CLASS 2 SEVERE OBESITY DUE TO EXCESS CALORIES WITH SERIOUS COMORBIDITY AND BODY MASS INDEX (BMI) OF 38.0 TO 38.9 IN ADULT (HCC): ICD-10-CM

## 2025-04-28 DIAGNOSIS — E78.2 MIXED HYPERLIPIDEMIA: ICD-10-CM

## 2025-04-28 DIAGNOSIS — I50.32 CHRONIC DIASTOLIC HEART FAILURE (HCC): Primary | ICD-10-CM

## 2025-04-28 DIAGNOSIS — I48.0 PAF (PAROXYSMAL ATRIAL FIBRILLATION) (HCC): ICD-10-CM

## 2025-04-28 DIAGNOSIS — I10 ESSENTIAL HYPERTENSION: ICD-10-CM

## 2025-04-28 DIAGNOSIS — E66.01 CLASS 2 SEVERE OBESITY DUE TO EXCESS CALORIES WITH SERIOUS COMORBIDITY AND BODY MASS INDEX (BMI) OF 38.0 TO 38.9 IN ADULT (HCC): ICD-10-CM

## 2025-04-28 PROCEDURE — 3075F SYST BP GE 130 - 139MM HG: CPT | Performed by: INTERNAL MEDICINE

## 2025-04-28 PROCEDURE — G2211 COMPLEX E/M VISIT ADD ON: HCPCS | Performed by: INTERNAL MEDICINE

## 2025-04-28 PROCEDURE — 93000 ELECTROCARDIOGRAM COMPLETE: CPT | Performed by: INTERNAL MEDICINE

## 2025-04-28 PROCEDURE — 1159F MED LIST DOCD IN RCRD: CPT | Performed by: INTERNAL MEDICINE

## 2025-04-28 PROCEDURE — 99214 OFFICE O/P EST MOD 30 MIN: CPT | Performed by: INTERNAL MEDICINE

## 2025-04-28 PROCEDURE — 1123F ACP DISCUSS/DSCN MKR DOCD: CPT | Performed by: INTERNAL MEDICINE

## 2025-04-28 PROCEDURE — 3078F DIAST BP <80 MM HG: CPT | Performed by: INTERNAL MEDICINE

## 2025-06-11 ENCOUNTER — TELEPHONE (OUTPATIENT)
Dept: ORTHOPEDIC SURGERY | Age: 85
End: 2025-06-11

## 2025-06-11 NOTE — TELEPHONE ENCOUNTER
----- Message from [facility] sent at 6/11/2025  4:01 PM EDT -----  Regarding: Specialty Message to Provider  Specialty Message to Provider    Relationship to Patient: Self     Patient Message: LT KNEE  --------------------------------------------------------------------------------------------------------------------------    Call Back Information: OK to leave message on voicemail  Preferred Call Back Number: Phone [phone] CELL OR   [phone]    PATIENT CALLING REGARDING HE HAD KNEE REPLACEMENT IN FEBRUARY OF 2013. PATIENT STATED THAT WHEN HE GETS UP, HIS KNEE IS MAKING A KNOCKING SOUND, IT STARTED LAST NIGHT.    PATIENT DIDN'T KNOW IF THIS WILL INJURED THE KNEE JOINT. PATIENT HAS AN UPCOMING APPOINTMENT ON JUNE 23, 2025.

## 2025-06-23 ENCOUNTER — OFFICE VISIT (OUTPATIENT)
Dept: ORTHOPEDIC SURGERY | Age: 85
End: 2025-06-23
Payer: MEDICARE

## 2025-06-23 VITALS — BODY MASS INDEX: 38.14 KG/M2 | WEIGHT: 243 LBS | RESPIRATION RATE: 14 BRPM | HEIGHT: 67 IN

## 2025-06-23 DIAGNOSIS — Z96.652 STATUS POST TOTAL LEFT KNEE REPLACEMENT: Primary | Chronic | ICD-10-CM

## 2025-06-23 DIAGNOSIS — M17.11 PRIMARY OSTEOARTHRITIS OF RIGHT KNEE: ICD-10-CM

## 2025-06-23 DIAGNOSIS — M25.561 RIGHT KNEE PAIN, UNSPECIFIED CHRONICITY: ICD-10-CM

## 2025-06-23 PROCEDURE — 1159F MED LIST DOCD IN RCRD: CPT | Performed by: PHYSICIAN ASSISTANT

## 2025-06-23 PROCEDURE — 99204 OFFICE O/P NEW MOD 45 MIN: CPT | Performed by: PHYSICIAN ASSISTANT

## 2025-06-23 PROCEDURE — 1125F AMNT PAIN NOTED PAIN PRSNT: CPT | Performed by: PHYSICIAN ASSISTANT

## 2025-06-23 PROCEDURE — 1123F ACP DISCUSS/DSCN MKR DOCD: CPT | Performed by: PHYSICIAN ASSISTANT

## 2025-06-23 NOTE — PROGRESS NOTES
This dictation was done with Dragon dictation and may contain mechanical errors related to translation.    I have today reviewed with Ibrahima Siddiqui the clinically relevant, past medical history, medications, allergies, family history, social history, and Review Of Systems form the patient’s most recent history form & I have documented any details relevant to today's presenting complaints in my history below. Mr. Ibrahima Siddiqui's self-reported past medical history, medications, allergies, family history, social history, and Review Of Systems form has been scanned into the chart under the \"Media\" tab.    Subjective:  Ibrahima Siddiqui is a 84 y.o. who is here as a new patient of [facility] orthopedics having ongoing pain with osteoarthritis in his right knee.  He was seen actually by Dr. Callahan over 3 years ago for his left knee and his left knee was replaced by Dr. Cornejo in 2013.  Recently he just finished series of Euflexxa injections and he had a cortisone shot on 4/25/2025 90s provided ongoing relief may have definitely become less effective over time he has a hard time walking a distance is having to use a cane and literally walking 10 to 20 feet he feels like his knee is going to give out on him and typically does he is afraid of falling    Patient Active Problem List   Diagnosis    Primary osteoarthritis of right knee    Status post total left knee replacement    Essential hypertension    GERD (gastroesophageal reflux disease)    High cholesterol    Pulmonic stenosis    RBBB    Paroxysmal atrial fibrillation (HCC)    Chronic diastolic CHF (congestive heart failure) (HCC)    History of stroke    Class II obesity    Secondary hypercoagulable state           Current Outpatient Medications on File Prior to Visit   Medication Sig Dispense Refill    furosemide (LASIX) 20 MG tablet Take 1 tablet by mouth daily for 7 days 7 tablet 0    lisinopril (PRINIVIL;ZESTRIL) 10 MG tablet Take 1 tablet by mouth daily TAKE 1 TABLET

## 2025-06-30 ENCOUNTER — TELEPHONE (OUTPATIENT)
Dept: ORTHOPEDIC SURGERY | Age: 85
End: 2025-06-30
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
## 2025-08-08 PROBLEM — E87.6 HYPOKALEMIA: Status: ACTIVE | Noted: 2025-08-08

## 2025-08-08 PROBLEM — R60.0 BILATERAL LOWER EXTREMITY EDEMA: Status: ACTIVE | Noted: 2025-08-08

## 2025-08-08 PROBLEM — R79.89 LOW SERUM TOTAL PROTEIN LEVEL: Status: ACTIVE | Noted: 2025-08-08

## 2025-09-02 ENCOUNTER — TELEPHONE (OUTPATIENT)
Dept: ORTHOPEDIC SURGERY | Age: 85
End: 2025-09-02